# Patient Record
Sex: FEMALE | Race: OTHER | HISPANIC OR LATINO | URBAN - METROPOLITAN AREA
[De-identification: names, ages, dates, MRNs, and addresses within clinical notes are randomized per-mention and may not be internally consistent; named-entity substitution may affect disease eponyms.]

---

## 2017-04-07 ENCOUNTER — EMERGENCY (EMERGENCY)
Facility: HOSPITAL | Age: 22
LOS: 0 days | Discharge: HOME | End: 2017-04-07

## 2017-06-27 DIAGNOSIS — B34.9 VIRAL INFECTION, UNSPECIFIED: ICD-10-CM

## 2017-06-27 DIAGNOSIS — R07.9 CHEST PAIN, UNSPECIFIED: ICD-10-CM

## 2017-06-27 DIAGNOSIS — Z98.890 OTHER SPECIFIED POSTPROCEDURAL STATES: ICD-10-CM

## 2017-06-27 DIAGNOSIS — H92.03 OTALGIA, BILATERAL: ICD-10-CM

## 2017-06-27 PROBLEM — Z00.00 ENCOUNTER FOR PREVENTIVE HEALTH EXAMINATION: Status: ACTIVE | Noted: 2017-06-27

## 2018-02-09 ENCOUNTER — EMERGENCY (EMERGENCY)
Facility: HOSPITAL | Age: 23
LOS: 0 days | Discharge: HOME | End: 2018-02-09
Attending: EMERGENCY MEDICINE

## 2018-02-09 VITALS
WEIGHT: 154.98 LBS | DIASTOLIC BLOOD PRESSURE: 81 MMHG | TEMPERATURE: 98 F | HEART RATE: 85 BPM | HEIGHT: 71 IN | SYSTOLIC BLOOD PRESSURE: 127 MMHG | RESPIRATION RATE: 18 BRPM | OXYGEN SATURATION: 100 %

## 2018-02-09 DIAGNOSIS — R50.9 FEVER, UNSPECIFIED: ICD-10-CM

## 2018-02-09 DIAGNOSIS — B97.89 OTHER VIRAL AGENTS AS THE CAUSE OF DISEASES CLASSIFIED ELSEWHERE: ICD-10-CM

## 2018-02-09 DIAGNOSIS — R05 COUGH: ICD-10-CM

## 2018-02-09 DIAGNOSIS — J06.9 ACUTE UPPER RESPIRATORY INFECTION, UNSPECIFIED: ICD-10-CM

## 2018-02-09 DIAGNOSIS — M94.0 CHONDROCOSTAL JUNCTION SYNDROME [TIETZE]: ICD-10-CM

## 2018-02-09 NOTE — ED PROVIDER NOTE - ATTENDING CONTRIBUTION TO CARE
Pt is a 22 yo female who comes in for 1 week of cough productive of clear phelgm.  No SOB.  Notes chest pain with cough.  Fever at started on first 1-2d of symptoms but has resolved.  Mild sore throat now.  LMP 3 weeks ago.    Exam: normal pharynx, clear lungs, normal respiratory effort, cap refill <2s, reproducible b/l chest wall tenderness  Imp: viral URI, costochondritis  Plan: NSAIDs, cough suppressants, dc home

## 2018-02-09 NOTE — ED PROVIDER NOTE - NS ED ROS FT
Review of Systems  Constitutional: (-) fever (-) weight loss (-) weight gain (-) night sweats (-) weakness (-) weight loss  Cardiovascular: (-) syncope (-) chest pain (-) orthopnea (-) PND (-) palpitations  ENT: (-) sore throat (-) ear pain (-) runny nose  Respiratory: See HPI.  GI: (-) abdominal pain  Integumentary: (-) rash (-) redness (-) abrasions  Neurological: (-) focal neurologic deficit (-) focal deficit (-) altered mental status

## 2018-02-09 NOTE — ED PROVIDER NOTE - PHYSICAL EXAMINATION
Physical Exam  Vital Signs: I have reviewed the initial vital signs.  Constitutional: well-nourished, appears stated age, no acute distress  ENT: TM clear, no pharyngeal erythema or exudates, no rhinorrhea or nasal edema.  Cardiovascular: regular rate, regular rhythm, well-perfused extremities  Respiratory: unlabored respiratory effort, clear to auscultation bilaterally  Gastrointestinal: soft, non-tender abdomen,  Musculoskeletal: supple neck, no lower extremity edema  Integumentary: warm, dry, no rash

## 2018-02-09 NOTE — ED PROVIDER NOTE - OBJECTIVE STATEMENT
Pt. is a 24 yo f with no sig pmhx reports with cough one wek in durration and rib tenderness. She was coughing with Pt. is a 22 yo f with no sig pmhx reports with cough one week in duration and rib tenderness. The cough is produces clear yellow sputum, worse at night not associated with wheezing or SOB. Reports one episode of subjective fever last night not associated with chills or rigors. No recent travel, pt. has not taken anything for the cough. Denies n/v, abdominal pain, chest pain, pleurisy, exertional dyspnea, and myalgias.    I have reviewed available current nursing and previous documentation of past medical, surgical, family, and/or social history.

## 2018-06-19 ENCOUNTER — EMERGENCY (EMERGENCY)
Facility: HOSPITAL | Age: 23
LOS: 0 days | Discharge: HOME | End: 2018-06-19
Attending: EMERGENCY MEDICINE | Admitting: EMERGENCY MEDICINE

## 2018-06-19 VITALS
SYSTOLIC BLOOD PRESSURE: 117 MMHG | HEART RATE: 92 BPM | OXYGEN SATURATION: 98 % | DIASTOLIC BLOOD PRESSURE: 76 MMHG | RESPIRATION RATE: 18 BRPM | HEIGHT: 60 IN | TEMPERATURE: 98 F | WEIGHT: 166.89 LBS
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
## 2018-06-19 DIAGNOSIS — S63.91XA SPRAIN OF UNSPECIFIED PART OF RIGHT WRIST AND HAND, INITIAL ENCOUNTER: ICD-10-CM

## 2018-06-19 DIAGNOSIS — Z79.899 OTHER LONG TERM (CURRENT) DRUG THERAPY: ICD-10-CM
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
## 2018-06-19 DIAGNOSIS — S93.402A SPRAIN OF UNSPECIFIED LIGAMENT OF LEFT ANKLE, INITIAL ENCOUNTER: ICD-10-CM

## 2018-06-19 DIAGNOSIS — S80.11XA CONTUSION OF RIGHT LOWER LEG, INITIAL ENCOUNTER: ICD-10-CM

## 2018-06-19 DIAGNOSIS — S49.91XA UNSPECIFIED INJURY OF RIGHT SHOULDER AND UPPER ARM, INITIAL ENCOUNTER: ICD-10-CM
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
## 2018-06-19 NOTE — ED PROVIDER NOTE - CARE PLAN
Principal Discharge DX:	Hand sprain  Secondary Diagnosis:	Ankle sprain  Secondary Diagnosis:	Contusion

## 2018-06-19 NOTE — ED PROVIDER NOTE - PROGRESS NOTE DETAILS
MDM:  xrays unremarkable.  pt likely with ankle sprain.  pt placed in splint.  Pt likely with right leg contusion, no fracture seen.  Right hand with contusion.  Hand placed in ace wrap.  pt dc with outpatient ortho follow up.  Pt well appearing.

## 2018-06-19 NOTE — ED ADULT TRIAGE NOTE - CHIEF COMPLAINT QUOTE
Patient complaining of fall while walking her dog. Denies head trauma or LOC. Complaining of pain to B/L elbows, right shin and right hand.

## 2018-06-19 NOTE — ED PROVIDER NOTE - OBJECTIVE STATEMENT
23 year old female states was walking her dogs and fell and injured her right arm and leg and left ankle. no head injury no loss of consciousness. patient was able to get up with assistance. denies trunk injury. no nausea and vomiting no neck pain.

## 2018-06-19 NOTE — ED PROVIDER NOTE - NS ED ROS FT
Constitutional: (-) fever  Musculoskeletal: (-) neck pain, (-) back pain, (+) joint pain  Integumentary: (-) rash, (-) edema  Neurological: (-) headache, (-) altered mental status  Psychiatric: (-) hallucinations  Allergic/Immunologic: (-) pruritus

## 2018-06-19 NOTE — ED PROVIDER NOTE - ATTENDING CONTRIBUTION TO CARE
22 yo f with no pmh presents with multiple injuries s/p mechanical fall.  pt was walking her two dogs and the dogs got scared and ran away.  pt tripped and fell and injured right leg, left ankle and right hand.  no head injury, no loc, no nausea, no vomiting, no abd pain, no cp, no sob, no back pain, no other complaints.  no numbness, no weakness.    awake, alert.  neck supple.  abd soft, nontender.  pelvis stable.    RUE: mild hand tenderness, no open skin, mild tenderness to 2nd metacarpal, no deformity, rom intact in hand.  no wrist tenderness, no elbow or shoulder tenderness; rom off wrist intact.    RLE: mild tenderness/ecchymosis to proximal calf, no bony tenderness, no knee tenderness, no ankle/foot tenderness, motor/sensation intact; rom of knee intact.  LLE: no knee tenderness, no mid foot tenderness, no tenderness to base of 5th metatarsal; + ankle swelling and tenderness, no open skin, 2+ dp pulse, motor/sensation intact.  a/p:  multiple extrmity injuries.  p:  xrays, reassess.  NO bleeding, no lacerations. no abrasions

## 2018-06-19 NOTE — ED PROVIDER NOTE - PHYSICAL EXAMINATION
Physical Exam    Vital Signs: I have reviewed the initial vital signs.  Constitutional: well-nourished, appears stated age, no acute distress  Cardiovascular: S1 and S2, regular rate, regular rhythm, well-perfused extremities, radial pulses equal and 2+  Respiratory: unlabored respiratory effort, clear to auscultation bilaterally no wheezing, rales and rhonchi  Gastrointestinal: soft, non-tender abdomen, no pulsatile mass, normal bowl sounds  Musculoskeletal: supple neck, no lower extremity edema, no midline tenderness, + Left ankle tenderness and swelling LROM secondary to pain, no knee or foot tenderness, good and equal dp pulses. + right proximal lower leg tenderness and swelling and area of ecchymosis present, FROM of knee and ankle, patient is able to bear weight on leg. + right hand/wrist tenderness and swelling FROM of entire arm and good and equal radial pulses.   Integumentary: warm, dry, no rash  Neurologic: awake, alert, cranial nerves II-XII grossly intact, extremities’ motor and sensory functions grossly intact  Psychiatric: appropriate mood, appropriate affect

## 2018-06-20 NOTE — ED PROCEDURE NOTE - NS ED PERI NEURO NEG
The patient/caregiver verbalized understanding of how to care for the injured extremity with splint/Post-application: Motor, sensory, and vascular responses intact in the injured extremity./Pre-application: Motor, sensory, and vascular responses intact in the injured extremity.
Post-application: Motor, sensory, and vascular responses intact in the injured extremity./The patient/caregiver verbalized understanding of how to care for the injured extremity with splint/Pre-application: Motor, sensory, and vascular responses intact in the injured extremity.

## 2018-06-20 NOTE — ED PROCEDURE NOTE - ATTENDING CONTRIBUTION TO CARE
I was present for and supervised the key/critical aspects of the procedures performed during the care of the patient.
I was present for and supervised the key/critical aspects of the procedures performed during the care of the patient.

## 2018-06-20 NOTE — ED PROCEDURE NOTE - CPROC ED TIME OUT STATEMENT1
“Patient's name, , procedure and correct site were confirmed during the Anthony Timeout.”
“Patient's name, , procedure and correct site were confirmed during the Felda Timeout.”

## 2018-06-20 NOTE — ED PROCEDURE NOTE - CPROC ED POST PROC CARE GUIDE1
Keep the cast/splint/dressing clean and dry./Verbal/written post procedure instructions were given to patient/caregiver./Instructed patient/caregiver to follow-up with primary care physician./Elevate the injured extremity as instructed.
Verbal/written post procedure instructions were given to patient/caregiver./Instructed patient/caregiver to follow-up with primary care physician./Instructed patient/caregiver regarding signs and symptoms of infection./Elevate the injured extremity as instructed./Keep the cast/splint/dressing clean and dry.

## 2018-07-06 NOTE — ED PROVIDER NOTE - DISCHARGE REVIEW MATERIAL PRESENTED
Patient is returning call for her results.    Phone: [phone]   
Returned call to patient per request. Stress Test results conveyed, stable. Patient verbalizes understanding and has no further questions at this time.   
.

## 2018-12-22 ENCOUNTER — EMERGENCY (EMERGENCY)
Facility: HOSPITAL | Age: 23
LOS: 0 days | Discharge: HOME | End: 2018-12-22
Attending: EMERGENCY MEDICINE | Admitting: EMERGENCY MEDICINE

## 2018-12-22 VITALS
SYSTOLIC BLOOD PRESSURE: 139 MMHG | TEMPERATURE: 100 F | HEART RATE: 118 BPM | OXYGEN SATURATION: 97 % | WEIGHT: 149.91 LBS | RESPIRATION RATE: 18 BRPM | DIASTOLIC BLOOD PRESSURE: 92 MMHG | HEIGHT: 60 IN

## 2018-12-22 VITALS
HEART RATE: 73 BPM | RESPIRATION RATE: 18 BRPM | SYSTOLIC BLOOD PRESSURE: 112 MMHG | TEMPERATURE: 98 F | DIASTOLIC BLOOD PRESSURE: 70 MMHG | OXYGEN SATURATION: 98 %

## 2018-12-22 DIAGNOSIS — K08.89 OTHER SPECIFIED DISORDERS OF TEETH AND SUPPORTING STRUCTURES: ICD-10-CM

## 2018-12-22 DIAGNOSIS — J02.9 ACUTE PHARYNGITIS, UNSPECIFIED: ICD-10-CM

## 2018-12-22 DIAGNOSIS — R22.0 LOCALIZED SWELLING, MASS AND LUMP, HEAD: ICD-10-CM

## 2018-12-22 DIAGNOSIS — Z79.2 LONG TERM (CURRENT) USE OF ANTIBIOTICS: ICD-10-CM

## 2018-12-22 LAB
ALBUMIN SERPL ELPH-MCNC: 4.6 G/DL — SIGNIFICANT CHANGE UP (ref 3.5–5.2)
ALP SERPL-CCNC: 96 U/L — SIGNIFICANT CHANGE UP (ref 30–115)
ALT FLD-CCNC: 19 U/L — SIGNIFICANT CHANGE UP (ref 0–41)
ANION GAP SERPL CALC-SCNC: 15 MMOL/L — HIGH (ref 7–14)
AST SERPL-CCNC: 25 U/L — SIGNIFICANT CHANGE UP (ref 0–41)
BASOPHILS # BLD AUTO: 0.02 K/UL — SIGNIFICANT CHANGE UP (ref 0–0.2)
BASOPHILS NFR BLD AUTO: 0.2 % — SIGNIFICANT CHANGE UP (ref 0–1)
BILIRUB SERPL-MCNC: 0.9 MG/DL — SIGNIFICANT CHANGE UP (ref 0.2–1.2)
BUN SERPL-MCNC: 12 MG/DL — SIGNIFICANT CHANGE UP (ref 10–20)
CALCIUM SERPL-MCNC: 9.5 MG/DL — SIGNIFICANT CHANGE UP (ref 8.5–10.1)
CHLORIDE SERPL-SCNC: 100 MMOL/L — SIGNIFICANT CHANGE UP (ref 98–110)
CO2 SERPL-SCNC: 24 MMOL/L — SIGNIFICANT CHANGE UP (ref 17–32)
CREAT SERPL-MCNC: 0.8 MG/DL — SIGNIFICANT CHANGE UP (ref 0.7–1.5)
EOSINOPHIL # BLD AUTO: 0.2 K/UL — SIGNIFICANT CHANGE UP (ref 0–0.7)
EOSINOPHIL NFR BLD AUTO: 2 % — SIGNIFICANT CHANGE UP (ref 0–8)
GLUCOSE SERPL-MCNC: 95 MG/DL — SIGNIFICANT CHANGE UP (ref 70–99)
HCT VFR BLD CALC: 40.8 % — SIGNIFICANT CHANGE UP (ref 37–47)
HGB BLD-MCNC: 13.6 G/DL — SIGNIFICANT CHANGE UP (ref 12–16)
IMM GRANULOCYTES NFR BLD AUTO: 0.3 % — SIGNIFICANT CHANGE UP (ref 0.1–0.3)
LACTATE SERPL-SCNC: 1 MMOL/L — SIGNIFICANT CHANGE UP (ref 0.5–2.2)
LYMPHOCYTES # BLD AUTO: 2.06 K/UL — SIGNIFICANT CHANGE UP (ref 1.2–3.4)
LYMPHOCYTES # BLD AUTO: 20.4 % — LOW (ref 20.5–51.1)
MCHC RBC-ENTMCNC: 29.6 PG — SIGNIFICANT CHANGE UP (ref 27–31)
MCHC RBC-ENTMCNC: 33.3 G/DL — SIGNIFICANT CHANGE UP (ref 32–37)
MCV RBC AUTO: 88.7 FL — SIGNIFICANT CHANGE UP (ref 81–99)
MONOCYTES # BLD AUTO: 0.69 K/UL — HIGH (ref 0.1–0.6)
MONOCYTES NFR BLD AUTO: 6.8 % — SIGNIFICANT CHANGE UP (ref 1.7–9.3)
NEUTROPHILS # BLD AUTO: 7.1 K/UL — HIGH (ref 1.4–6.5)
NEUTROPHILS NFR BLD AUTO: 70.3 % — SIGNIFICANT CHANGE UP (ref 42.2–75.2)
NRBC # BLD: 0 /100 WBCS — SIGNIFICANT CHANGE UP (ref 0–0)
PLATELET # BLD AUTO: 312 K/UL — SIGNIFICANT CHANGE UP (ref 130–400)
POTASSIUM SERPL-MCNC: 5.1 MMOL/L — HIGH (ref 3.5–5)
POTASSIUM SERPL-SCNC: 5.1 MMOL/L — HIGH (ref 3.5–5)
PROT SERPL-MCNC: 8 G/DL — SIGNIFICANT CHANGE UP (ref 6–8)
RBC # BLD: 4.6 M/UL — SIGNIFICANT CHANGE UP (ref 4.2–5.4)
RBC # FLD: 13.1 % — SIGNIFICANT CHANGE UP (ref 11.5–14.5)
SODIUM SERPL-SCNC: 139 MMOL/L — SIGNIFICANT CHANGE UP (ref 135–146)
WBC # BLD: 10.1 K/UL — SIGNIFICANT CHANGE UP (ref 4.8–10.8)
WBC # FLD AUTO: 10.1 K/UL — SIGNIFICANT CHANGE UP (ref 4.8–10.8)

## 2018-12-22 RX ORDER — KETOROLAC TROMETHAMINE 30 MG/ML
30 SYRINGE (ML) INJECTION ONCE
Qty: 0 | Refills: 0 | Status: DISCONTINUED | OUTPATIENT
Start: 2018-12-22 | End: 2018-12-22

## 2018-12-22 RX ORDER — SODIUM CHLORIDE 9 MG/ML
1000 INJECTION, SOLUTION INTRAVENOUS ONCE
Qty: 0 | Refills: 0 | Status: COMPLETED | OUTPATIENT
Start: 2018-12-22 | End: 2018-12-22

## 2018-12-22 RX ADMIN — Medication 30 MILLIGRAM(S): at 11:09

## 2018-12-22 RX ADMIN — SODIUM CHLORIDE 1000 MILLILITER(S): 9 INJECTION, SOLUTION INTRAVENOUS at 11:09

## 2018-12-22 NOTE — ED ADULT NURSE NOTE - NSIMPLEMENTINTERV_GEN_ALL_ED
Implemented All Universal Safety Interventions:  [place] to call system. Call bell, personal items and telephone within reach. Instruct patient to call for assistance. Room bathroom lighting operational. Non-slip footwear when patient is off stretcher. Physically safe environment: no spills, clutter or unnecessary equipment. Stretcher in lowest position, wheels locked, appropriate side rails in place.

## 2018-12-22 NOTE — ED PROVIDER NOTE - PHYSICAL EXAMINATION
GEN: Alert & Oriented x 3, No acute distress. Calm, appropriate.  Head and Neck:  (+) cervical lymphadenopathy.   ENT Oral mucosa pink, moist without lesions. Pharyngeal injection noted. No exudate. Some right facial swelling. No tenderness or fluctuance of right lower gingiva. No dental caries noted. TM clear bilaterally. No mastoid tenderness. No tenderness with manipulation of pinna and tragus.  Eyes: PERRL. No conjunctival injection.   RESP: Lungs clear to auscult bilat. no wheezes, rhonchi or rales. No retractions. Equal air entry.  CARDIO: regular rate and rhythm, no murmurs, rubs or gallops. Normal S1, S2.   ABD: Soft, Nondistended. No rebound tenderness/guarding. No pulsatile mass. No tenderness with palpation x 4 quadrants.  MS: No midline tenderness throughout.   SKIN: no rashes/lesions, no petechiae, no ecchymosis.

## 2018-12-22 NOTE — ED PROVIDER NOTE - ATTENDING CONTRIBUTION TO CARE
23y Female with no significant PMH is presenting to ED with right sided facial swelling and pain x 2 days. Patientd enies cp/sob, no n/v/d, no loc. No trauma. Patient endorses pain over tooth 30-31.     CONSTITUTIONAL: Well-developed; well-nourished; in no acute distress. Sitting up and providing appropriate history and physical examination  SKIN: skin exam is warm and dry, no acute rash.  HEAD: Normocephalic; atraumatic.  EYES: PERRL, 3 mm bilateral, no nystagmus, EOM intact; conjunctiva and sclera clear.  ENT: + mild pharyngeal erythema, no exudate, non edema, no pooling of secretions, No nasal discharge; airway clear. + mild erythema near right inferior gingiva tooth 30, + midl tenderness, + right tender anterior cervical LN, no fluctaunce  NECK: Supple; non tender. + full passive ROM in all directions. No JVD  CARD: S1, S2 normal; no murmurs, gallops, or rubs. Regular rate and rhythm. + Symmetric Strong Pulses  RESP: No wheezes, rales or rhonchi. Good air movement bilaterally  NEURO:  Alert, oriented, grossly unremarkable. No Focal deficits. GCS 15. NIH 0  PSYCH: Cooperative, appropriate.       labs and imaging unremarkable. I have fully discussed the medical management and delivery of care with the patient. I have discussed any available labs, imaging and treatment options with the patient. Patient confirms understanding and has been given detailed return precautions. Patient instructed to return to the ED should symptoms persist or worsen. Patient has demonstrated capacity and has verbalized understanding. Patient is well appearing upon discharge. Will follow with pmd and dental. Started on antibiotics

## 2018-12-22 NOTE — ED PROVIDER NOTE - OBJECTIVE STATEMENT
The patient is a 23y Female with no significant PMH is presenting to ED with right sided facial swelling and pain x 2 days. She states the pain is located over her right jaw and radiates up into right ear. Patient endorses tooth pain and sore throat. She denies fever/chills, drainage, nasal congestions, sinus pressure, HA, chest pain, shortness of breath, & cough.

## 2018-12-22 NOTE — ED ADULT TRIAGE NOTE - CHIEF COMPLAINT QUOTE
Patient complaining of right sided facial swelling, bleeding after brushing her teeth, pain in her gums on right side 9/10.

## 2018-12-22 NOTE — ED PROVIDER NOTE - NSFOLLOWUPCLINICS_GEN_ALL_ED_FT
[facility]  Dental  [address] [place] [id number]  Phone: [phone]  Fax:   Follow Up Time:

## 2018-12-22 NOTE — ED PROVIDER NOTE - NS ED ROS FT
GEN: (-) fever, (-) chills  HEENT: (-) vision changes, (-) HA, (+) sore throat, (+) ear pain, (+) dentalgia, (+) facial swelling  CV: (-) chest pain, (-) palpitations  PULM: (-) cough, (-) wheezing, (-) dyspnea  GI: (-) abdominal pain,(-) Nausea, (-) Vomiting  NEURO: (-) weakness, (-) paresthesias, (-) syncope  : (-) dysuria, (-) urgency, (-) Frequency, (-) hematuria  MS: (-) back pain, (-) joint pain, (-)myalgias, (-) swelling  SKIN: (-) rashes, (-) new lesions  HEME: (-) bleeding, (-) ecchymosis

## 2018-12-23 ENCOUNTER — EMERGENCY (EMERGENCY)
Facility: HOSPITAL | Age: 23
LOS: 0 days | Discharge: HOME | End: 2018-12-23
Attending: EMERGENCY MEDICINE | Admitting: EMERGENCY MEDICINE

## 2018-12-23 VITALS
OXYGEN SATURATION: 99 % | RESPIRATION RATE: 20 BRPM | TEMPERATURE: 98 F | HEART RATE: 89 BPM | DIASTOLIC BLOOD PRESSURE: 87 MMHG | SYSTOLIC BLOOD PRESSURE: 142 MMHG

## 2018-12-23 DIAGNOSIS — K08.89 OTHER SPECIFIED DISORDERS OF TEETH AND SUPPORTING STRUCTURES: ICD-10-CM

## 2018-12-23 DIAGNOSIS — Z79.2 LONG TERM (CURRENT) USE OF ANTIBIOTICS: ICD-10-CM

## 2018-12-23 DIAGNOSIS — K02.9 DENTAL CARIES, UNSPECIFIED: ICD-10-CM

## 2018-12-23 RX ORDER — DEXAMETHASONE 0.5 MG/5ML
10 ELIXIR ORAL ONCE
Qty: 0 | Refills: 0 | Status: COMPLETED | OUTPATIENT
Start: 2018-12-23 | End: 2018-12-23

## 2018-12-23 RX ADMIN — Medication 10 MILLIGRAM(S): at 10:05

## 2018-12-23 NOTE — ED ADULT NURSE NOTE - NSIMPLEMENTINTERV_GEN_ALL_ED
Implemented All Universal Safety Interventions:  [place] to call system. Call bell, personal items and telephone within reach. Instruct patient to call for assistance. Room bathroom lighting operational. Non-slip footwear when patient is off stretcher. Physically safe environment: no spills, clutter or unnecessary equipment. Stretcher in lowest position, wheels locked, appropriate side rails in place.

## 2018-12-23 NOTE — ED PROVIDER NOTE - OBJECTIVE STATEMENT
22 y/o female c/o "I have right lower dental pain for 2-3 days with fever Tmax 100.4 and painful swallowing. I was seen at [facility] yesterday and I had a CT scan."

## 2018-12-23 NOTE — ED PROVIDER NOTE - NSFOLLOWUPINSTRUCTIONS_ED_ALL_ED_FT
Pharyngitis    Pharyngitis is inflammation of your pharynx, which is typically caused by a viral or bacterial infection. Pharyngitis can be contagious and may spread from person to person through intimate contact, coughing, sneezing, or sharing personal items and utensils. Symptoms of pharyngitis may include sore throat, fever, headache, or swollen lymph nodes. If you are prescribed antibiotics, make sure you finish them even if you start to feel better. Gargle with salt water as often as every 1-2 hours to soothe your throat. Throat lozenges (if you are not at risk for choking) or sprays may be used to soothe your throat.    SEEK IMMEDIATE MEDICAL CARE IF YOU HAVE ANY OF THE FOLLOWING SYMPTOMS: neck stiffness, drooling, hoarseness or change in voice, inability to swallow liquids, vomiting, or trouble breathing.    Dental Pain    Dental pain (toothache) may be caused by many things including tooth decay (cavities or caries), abscess or infection, or trauma. If you were prescribed an antibiotic medicine, finish all of it even if you start to feel better. Rinsing your mouth with salt water or applying ice to the painful area of your face may help with the pain. Follow up with a dentist is important in ensuring good oral health and preventing the worsening of dental disease.    SEEK IMMEDIATE MEDICAL CARE IF YOU HAVE ANY OF THE FOLLOWING SYMPTOMS: unable to open your mouth, trouble breathing or swallowing, fever, or swelling of the face, neck, or jaw.

## 2018-12-23 NOTE — ED ADULT NURSE NOTE - OBJECTIVE STATEMENT
pt went to [facility] yesterday and was prescribed oral ABX. pt presents today with halitosis, increased pain/ swelling of right side mouth and neck as per pt. pt states she has been drooling and has had difficulty swallowing. pt reports being in intense pain since this morning.

## 2018-12-23 NOTE — ED PROVIDER NOTE - PROGRESS NOTE DETAILS
Reviewed chart from [facility] no abscess. Patient prescribed Augmentin has not started so far. Patient evaluated by Dental recommend continue PO antibiotics follow up in dental clinic tomorrow. Patient feeling better. Able to open mouth better. Patient was able to take Augmentin and Motrin PO.

## 2018-12-23 NOTE — CONSULT NOTE ADULT - SUBJECTIVE AND OBJECTIVE BOX
Patient is a 23y old  Female who presents with a chief complaint of "My wisdom tooth has been hurting me in the back right, for two days now. It's a sharp pain."    HPI: 2 days      PAST MEDICAL & SURGICAL HISTORY:  No pertinent past medical history  No significant past surgical history    (   ) heart valve replacement  (   ) joint replacement  (   ) pregnancy    MEDICATIONS  (STANDING):    MEDICATIONS  (PRN):      REVIEW OF SYSTEMS      General:	    Skin/Breast:  	  Ophthalmologic:  	  ENMT:	    Respiratory and Thorax:  	  Cardiovascular:	    Gastrointestinal:	    Genitourinary:	    Musculoskeletal:	    Neurological:	    Psychiatric:	    Hematology/Lymphatics:	    Endocrine:	    Allergic/Immunologic:	    Allergies    No Known Allergies    Intolerances        FAMILY HISTORY:  No pertinent family history in first degree relatives      *SOCIAL HISTORY: (   ) Tobacco; (   ) ETOH    Vital Signs Last 24 Hrs  T(C): 36.5 (23 Dec 2018 08:48), Max: 36.6 (22 Dec 2018 16:00)  T(F): 97.7 (23 Dec 2018 08:48), Max: 97.8 (22 Dec 2018 16:00)  HR: 89 (23 Dec 2018 08:48) (73 - 89)  BP: 142/87 (23 Dec 2018 08:48) (112/70 - 142/87)  BP(mean): --  RR: 20 (23 Dec 2018 08:48) (18 - 20)  SpO2: 99% (23 Dec 2018 08:48) (98% - 99%)    LABS:                        13.6   10.10 )-----------( 312      ( 22 Dec 2018 11:30 )             40.8     12-22    139  |  100  |  12  ----------------------------<  95  5.1<H>   |  24  |  0.8    Ca    9.5      22 Dec 2018 11:30    TPro  8.0  /  Alb  4.6  /  TBili  0.9  /  DBili  x   /  AST  25  /  ALT  19  /  AlkPhos  96  12-22    Platelet Count - Automated: 312 K/uL [130 - 400] (12-22 @ 11:30)  WBC Count: 10.10 K/uL [4.80 - 10.80] (12-22 @ 11:30)          Last Dental Visit: << Patient did not report >>    EOE:  TMJ (   ) clicks                     (   ) pops                     (   ) crepitus             Mandible <<FROM>>             Facial bones and MOM <<grossly intact>>             ( x  ) trismus             ( x  ) lymphadenopathy             (  x ) swelling             (   ) asymmetry             ( x  ) palpation             (   ) dyspnea             (   ) dysphagia             (   ) loss of consciousness    IOE:  pericoronitis #32 with localized swelling extending to neck                hard/soft palate:  (   ) palatal torus, <<No pathology noted>>            tongue/FOM <<No pathology noted>>            labial/buccal mucosa <<No pathology noted>>           (   ) percussion           ( x  ) palpation           (   ) swelling            (   ) abscess           (   ) sinus tract    *DENTAL RADIOGRAPHS: none exposed    RADIOLOGY & ADDITIONAL STUDIES: n/a    *ASSESSMENT: buccal space abscess associated with #32 causing localized R sided neck and buccal swelling    *PLAN: evaluate with dental clinic, antibiotic and pain management as necessary    PROCEDURE:   Verbal and written consent given.  Anesthesia: <<  No anesthesia administered.  >>   Treatment: <<  Limited oral exam performed. Patient presents with pain of the past two days. Extra-oral right sided cheek and neck swelling. Patient experiences trismus - limited mouth opening of about 6 mm present. Patient does not have closed airway. Palpation sensitive along #32 vestibule. Percussion not able to be performed due to trismus. Patient did not take previous Augmentin prescription as provided from [facility]. Advised patient that due to infection from #32, swelling has occurred. Advised swelling needs to go down first before treatment can occur. Recommended prescription pain medication (patient previously taking over the counter Motrin for pain). Advised follow up with dental clinic on Monday (patient does not have insurance). Am not able to perform dental block due to limited mouth opening.  >>     RECOMMENDATIONS:  1) <<  Continuation of Augmentin prescription and Ibuprofen 600 mg, 10 tabs >>  2) Dental F/U with outpatient dentist for comprehensive dental care.   3) If any difficulty swallowing/breathing, fever occur, return to ER.     Resident Name, pager # [phone], [phone]

## 2018-12-23 NOTE — ED PROVIDER NOTE - CARE PLAN
Principal Discharge DX:	Pharyngitis  Secondary Diagnosis:	Toothache  Secondary Diagnosis:	Lymphadenopathy

## 2018-12-23 NOTE — ED PROVIDER NOTE - NSFOLLOWUPCLINICS_GEN_ALL_ED_FT
[facility]  Dentistry  .  [place] [address]  Phone: [phone]  Fax:   Follow Up Time: [facility]  Dental  [address] [place] [id number]  Phone: [phone]  Fax:

## 2018-12-23 NOTE — ED PROVIDER NOTE - ATTENDING CONTRIBUTION TO CARE
24 y/o female  here for eval I have right lower dental pain for 2-3 days with fever Tmax 100.4 and painful swallowing. pt was here for pain, seen at [facility] yesterday with ct soft tissue neck which was nmegative. pt given abx for 10 days for possible pharyngitis.  pt on exam with normal speech, ttp tooth #32, uvual midline, left pharyngeal exudate. impression dental and throat pain, pt seen by dental, plan for abx and dental clinic tomorrow. pt on abx for pharyngitis. pt tolerated po. dc home

## 2018-12-24 ENCOUNTER — EMERGENCY (EMERGENCY)
Facility: HOSPITAL | Age: 23
LOS: 0 days | Discharge: HOME | End: 2018-12-24
Admitting: PHYSICIAN ASSISTANT

## 2018-12-24 VITALS
HEIGHT: 60 IN | OXYGEN SATURATION: 98 % | WEIGHT: 149.91 LBS | RESPIRATION RATE: 18 BRPM | TEMPERATURE: 98 F | DIASTOLIC BLOOD PRESSURE: 82 MMHG | HEART RATE: 72 BPM | SYSTOLIC BLOOD PRESSURE: 112 MMHG

## 2018-12-24 DIAGNOSIS — Z79.2 LONG TERM (CURRENT) USE OF ANTIBIOTICS: ICD-10-CM

## 2018-12-24 DIAGNOSIS — K08.89 OTHER SPECIFIED DISORDERS OF TEETH AND SUPPORTING STRUCTURES: ICD-10-CM

## 2018-12-24 DIAGNOSIS — J34.89 OTHER SPECIFIED DISORDERS OF NOSE AND NASAL SINUSES: ICD-10-CM

## 2018-12-24 NOTE — ED PROVIDER NOTE - TOOTH FINDINGS
to right lower molar with minimal gum swelling, no bleeding, no drainage, no visible or palpable abscess/PERCUSSION TENDERNESS/DENTAL CARIES

## 2018-12-24 NOTE — ED PROVIDER NOTE - PLAN OF CARE
pt. with right lower molar toothache and swelling, advised by dental for extraction and requests to go to dental clinic. Spoke with dental clinic and approved EM appt. now. Pt. will continue her prescribed oral anbx and pain meds. Pt. aware to return to the ER if any difficulty swallowing, breathing, fevers, chills, excessive bleeding or any other concerns.

## 2018-12-24 NOTE — ED PROVIDER NOTE - OBJECTIVE STATEMENT
Pt. is a 23 year old female with an unremarkable past medical history who presents with complaints of pain & swelling to right lower molar/wisdom tooth x few days. Pt. on Augmentin oral antibiotics and nsaids with some relief but still having pain. Pt. was evaluated yesterday in the ED and advised by dental resident to come to the dental clinic today for an extraction. Pt. denies any difficulty swallowing, breathing, fevers, chills, neck pain, jaw pain, bleeding from mouth, otalgia, or any other complaints.

## 2018-12-24 NOTE — ED PROVIDER NOTE - MOUTH
no ulcers no ulcers/No tenderness with manipulation of pinna and tragus., no facial swelling or erythema

## 2018-12-24 NOTE — ED PROVIDER NOTE - CARE PLAN
Principal Discharge DX:	Toothache  Assessment and plan of treatment:	pt. with right lower molar toothache and swelling, advised by dental for extraction and requests to go to dental clinic. Spoke with dental clinic and approved EM appt. now. Pt. will continue her prescribed oral anbx and pain meds. Pt. aware to return to the ER if any difficulty swallowing, breathing, fevers, chills, excessive bleeding or any other concerns.

## 2018-12-24 NOTE — CONSULT NOTE ADULT - SUBJECTIVE AND OBJECTIVE BOX
Patient is a 23y old  Female who presents with a chief complaint of lower right dental pain and swelling for 4 days.    HPI: Patient reports that she has been to the emergency room for the past two nights due to large swelling of the lower right side. Patient reports that the swelling has decreased after taking antibiotics and steroids administered in the emergency room.  Patient reports that her last dose of antibiotics was taken at 10AM on 12/23/18.      PAST MEDICAL & SURGICAL HISTORY:  No pertinent past medical history  No significant past surgical history    ( -  ) heart valve replacement  ( -  ) joint replacement  ( -  ) pregnancy    MEDICATIONS  :  Amoxicillin and ibuprofen    Allergies :  No Known Allergies        FAMILY HISTORY:  No pertinent family history in first degree relatives      *SOCIAL HISTORY: ( -  ) Tobacco      Vital Signs Last 24 Hrs  T(C): 36.6 (24 Dec 2018 08:03), Max: 36.6 (24 Dec 2018 08:03)  T(F): 97.9 (24 Dec 2018 08:03), Max: 97.9 (24 Dec 2018 08:03)  HR: 72 (24 Dec 2018 08:03) (72 - 72)  BP: 112/82 (24 Dec 2018 08:03) (112/82 - 112/82)  BP(mean): --  RR: 18 (24 Dec 2018 08:03) (18 - 18)  SpO2: 98% (24 Dec 2018 08:03) (98% - 98%)    LABS:                        13.6   10.10 )-----------( 312      ( 22 Dec 2018 11:30 )             40.8     12-22    139  |  100  |  12  ----------------------------<  95  5.1<H>   |  24  |  0.8    Ca    9.5      22 Dec 2018 11:30    TPro  8.0  /  Alb  4.6  /  TBili  0.9  /  DBili  x   /  AST  25  /  ALT  19  /  AlkPhos  96  12-22    Platelet Count - Automated: 312 K/uL [130 - 400] (12-22 @ 11:30)  WBC Count: 10.10 K/uL [4.80 - 10.80] (12-22 @ 11:30)          Last Dental Visit: << many years ago >>    EOE:  TMJ ( -  ) clicks                     ( -  ) pops                     ( -  ) crepitus             Mandible <<FROM>>             Facial bones and MOM <<grossly intact>>             ( -  ) trismus             ( -  ) lymphadenopathy             ( -  ) swelling             ( -  ) asymmetry             ( +  ) palpation             ( -  ) dyspnea             ( -  ) dysphagia             ( -  ) loss of consciousness    IOE:  <<permanent>> dentition:            <<grossly intact>> OR             <<multiple carious teeth>>                      	    Mobility << - >>                     Caries << + >>                hard/soft palate:  ( -  ) palatal torus, <<No pathology noted>>            tongue/FOM <<No pathology noted>>            labial/buccal mucosa <<No pathology noted>>           ( -  ) percussion           ( +  ) palpation           ( +  ) swelling            ( -  ) abscess           ( -  ) sinus tract    *DENTAL RADIOGRAPHS: periapical of tooth #32    *ASSESSMENT: Upon clinical exam, pericoronitis noted distal to tooth #32. Very tender to palpation of area distal to #32. Erythema and purulent drainage noted distal to #32.     *PLAN:     PROCEDURE:   Verbal and written consent given.  Anesthesia: << 3 carpules of 4% septocaine with 1:[id number] epinephrine used for long buccal infiltration and inferior alveolar nerve block and 1 carpule of 3% mepivicaine used for local infiltration.    >>   Treatment: << Risks and benefits discussed as per OS sheet dated 7/13/00. Side/site consents obtained. Incision and flap made on the buccal aspect of #32 to reflect soft tissue. Tooth #32 extracted non-surgically using elevator and forceps. Post op radiograph negative. Hemostasis achieved. Post op instructions reviewed and provided written.    >>     Explained to patient that there is a large carious lesion in tooth #30 that requires dental treatment as soon as possible. Patient understands and says that she will return as soon as she applies for dental insurance. Informed patient that if pain or swelling return, to present to [facility] as an emergency. Stressed to patient the importance of taking remainder of antibiotics as prescribed.    RECOMMENDATIONS:  1) << Take medications as prescribed.  >>  2) Dental F/U with outpatient dentist or return to [facility] for comprehensive dental care.   3) If any difficulty swallowing/breathing, fever occur, return to ER.     Resident Name, pager #  David Jain, [phone]

## 2019-05-28 ENCOUNTER — OTHER (OUTPATIENT)
Age: 24
End: 2019-05-28

## 2019-06-15 ENCOUNTER — EMERGENCY (EMERGENCY)
Facility: HOSPITAL | Age: 24
LOS: 0 days | Discharge: HOME | End: 2019-06-15
Attending: EMERGENCY MEDICINE | Admitting: EMERGENCY MEDICINE
Payer: COMMERCIAL

## 2019-06-15 VITALS
RESPIRATION RATE: 18 BRPM | DIASTOLIC BLOOD PRESSURE: 70 MMHG | HEART RATE: 64 BPM | SYSTOLIC BLOOD PRESSURE: 114 MMHG | TEMPERATURE: 98 F

## 2019-06-15 VITALS
RESPIRATION RATE: 16 BRPM | TEMPERATURE: 96 F | OXYGEN SATURATION: 98 % | SYSTOLIC BLOOD PRESSURE: 124 MMHG | DIASTOLIC BLOOD PRESSURE: 77 MMHG | WEIGHT: 169.98 LBS | HEART RATE: 89 BPM

## 2019-06-15 DIAGNOSIS — R06.02 SHORTNESS OF BREATH: ICD-10-CM

## 2019-06-15 DIAGNOSIS — R10.11 RIGHT UPPER QUADRANT PAIN: ICD-10-CM

## 2019-06-15 DIAGNOSIS — K80.20 CALCULUS OF GALLBLADDER WITHOUT CHOLECYSTITIS WITHOUT OBSTRUCTION: ICD-10-CM

## 2019-06-15 DIAGNOSIS — K80.70 CALCULUS OF GALLBLADDER AND BILE DUCT WITHOUT CHOLECYSTITIS WITHOUT OBSTRUCTION: ICD-10-CM

## 2019-06-15 LAB
ALBUMIN SERPL ELPH-MCNC: 4.2 G/DL — SIGNIFICANT CHANGE UP (ref 3.5–5.2)
ALP SERPL-CCNC: 109 U/L — SIGNIFICANT CHANGE UP (ref 30–115)
ALT FLD-CCNC: 23 U/L — SIGNIFICANT CHANGE UP (ref 0–41)
ANION GAP SERPL CALC-SCNC: 8 MMOL/L — SIGNIFICANT CHANGE UP (ref 7–14)
AST SERPL-CCNC: 29 U/L — SIGNIFICANT CHANGE UP (ref 0–41)
BASOPHILS # BLD AUTO: 0.05 K/UL — SIGNIFICANT CHANGE UP (ref 0–0.2)
BASOPHILS NFR BLD AUTO: 0.5 % — SIGNIFICANT CHANGE UP (ref 0–1)
BILIRUB SERPL-MCNC: 0.4 MG/DL — SIGNIFICANT CHANGE UP (ref 0.2–1.2)
BUN SERPL-MCNC: 19 MG/DL — SIGNIFICANT CHANGE UP (ref 10–20)
CALCIUM SERPL-MCNC: 9.5 MG/DL — SIGNIFICANT CHANGE UP (ref 8.5–10.1)
CHLORIDE SERPL-SCNC: 104 MMOL/L — SIGNIFICANT CHANGE UP (ref 98–110)
CO2 SERPL-SCNC: 27 MMOL/L — SIGNIFICANT CHANGE UP (ref 17–32)
CREAT SERPL-MCNC: 0.5 MG/DL — LOW (ref 0.7–1.5)
EOSINOPHIL # BLD AUTO: 0.24 K/UL — SIGNIFICANT CHANGE UP (ref 0–0.7)
EOSINOPHIL NFR BLD AUTO: 2.6 % — SIGNIFICANT CHANGE UP (ref 0–8)
GLUCOSE SERPL-MCNC: 105 MG/DL — HIGH (ref 70–99)
HCT VFR BLD CALC: 40 % — SIGNIFICANT CHANGE UP (ref 37–47)
HGB BLD-MCNC: 13 G/DL — SIGNIFICANT CHANGE UP (ref 12–16)
IMM GRANULOCYTES NFR BLD AUTO: 0.4 % — HIGH (ref 0.1–0.3)
LIDOCAIN IGE QN: 31 U/L — SIGNIFICANT CHANGE UP (ref 7–60)
LYMPHOCYTES # BLD AUTO: 3.12 K/UL — SIGNIFICANT CHANGE UP (ref 1.2–3.4)
LYMPHOCYTES # BLD AUTO: 34 % — SIGNIFICANT CHANGE UP (ref 20.5–51.1)
MCHC RBC-ENTMCNC: 29.2 PG — SIGNIFICANT CHANGE UP (ref 27–31)
MCHC RBC-ENTMCNC: 32.5 G/DL — SIGNIFICANT CHANGE UP (ref 32–37)
MCV RBC AUTO: 89.9 FL — SIGNIFICANT CHANGE UP (ref 81–99)
MONOCYTES # BLD AUTO: 0.75 K/UL — HIGH (ref 0.1–0.6)
MONOCYTES NFR BLD AUTO: 8.2 % — SIGNIFICANT CHANGE UP (ref 1.7–9.3)
NEUTROPHILS # BLD AUTO: 4.97 K/UL — SIGNIFICANT CHANGE UP (ref 1.4–6.5)
NEUTROPHILS NFR BLD AUTO: 54.3 % — SIGNIFICANT CHANGE UP (ref 42.2–75.2)
NRBC # BLD: 0 /100 WBCS — SIGNIFICANT CHANGE UP (ref 0–0)
PLATELET # BLD AUTO: 312 K/UL — SIGNIFICANT CHANGE UP (ref 130–400)
POTASSIUM SERPL-MCNC: 5.2 MMOL/L — HIGH (ref 3.5–5)
POTASSIUM SERPL-SCNC: 5.2 MMOL/L — HIGH (ref 3.5–5)
PROT SERPL-MCNC: 7.6 G/DL — SIGNIFICANT CHANGE UP (ref 6–8)
RBC # BLD: 4.45 M/UL — SIGNIFICANT CHANGE UP (ref 4.2–5.4)
RBC # FLD: 13.3 % — SIGNIFICANT CHANGE UP (ref 11.5–14.5)
SODIUM SERPL-SCNC: 139 MMOL/L — SIGNIFICANT CHANGE UP (ref 135–146)
WBC # BLD: 9.17 K/UL — SIGNIFICANT CHANGE UP (ref 4.8–10.8)
WBC # FLD AUTO: 9.17 K/UL — SIGNIFICANT CHANGE UP (ref 4.8–10.8)

## 2019-06-15 PROCEDURE — 99284 EMERGENCY DEPT VISIT MOD MDM: CPT | Mod: 25

## 2019-06-15 PROCEDURE — 99283 EMERGENCY DEPT VISIT LOW MDM: CPT

## 2019-06-15 PROCEDURE — 71046 X-RAY EXAM CHEST 2 VIEWS: CPT | Mod: 26

## 2019-06-15 PROCEDURE — 76705 ECHO EXAM OF ABDOMEN: CPT | Mod: 26

## 2019-06-15 RX ORDER — SODIUM CHLORIDE 9 MG/ML
1000 INJECTION, SOLUTION INTRAVENOUS ONCE
Refills: 0 | Status: COMPLETED | OUTPATIENT
Start: 2019-06-15 | End: 2019-06-15

## 2019-06-15 RX ADMIN — SODIUM CHLORIDE 1000 MILLILITER(S): 9 INJECTION, SOLUTION INTRAVENOUS at 07:13

## 2019-06-15 NOTE — CONSULT NOTE ADULT - ASSESSMENT
Impression:  25 y/o female with RUQ abdominal pain / Biliary colic with Sonogram findings of Cholelithiasis.

## 2019-06-15 NOTE — ED PROVIDER NOTE - PROGRESS NOTE DETAILS
Patient is PERC negative. No fever and normal lung sounds. + RUQ tenderness. Endoresed from Dr. Cotto. One week of right upper quadrant pain. No fever. Does feel shortness of breath at times. On exam S1S2 rrr, lungs clear, abdomen is soft + epigastric and right upper abdominal tenderness. Pt seen and evaled by Dr. Lawrence of surgery- patient does not wantsurgery at this time and wants as out patient.

## 2019-06-15 NOTE — ED PROVIDER NOTE - PHYSICAL EXAMINATION
VITAL SIGNS: I have reviewed nursing notes and confirm.  CONSTITUTIONAL: well-appearing, non-toxic, obese female, NAD  SKIN: Warm dry, normal skin turgor  HEAD: NCAT  EYES: EOMI, PERRLA, no scleral icterus  ENT: Moist mucous membranes, normal pharynx with no erythema or exudates  NECK: Supple; non tender. Full ROM. No cervical LAD  CARD: RRR, no murmurs, rubs or gallops  RESP: clear to ausculation b/l.  No rales, rhonchi, or wheezing.  ABD: soft, + RUQ tenderness, no rebound or guarding. No CVA tenderness  EXT: Full ROM, no bony tenderness, no pedal edema, no calf tenderness  NEURO: normal motor. normal sensory. CN II-XII intact. Cerebellar testing normal. Normal gait.  PSYCH: Cooperative, appropriate.

## 2019-06-15 NOTE — ED PROVIDER NOTE - OBJECTIVE STATEMENT
23 y/o F no PMH who presents with RUQ pain for the past 4 days.  Patient reports pain as sharp, 7/10, worse with deep breathing, non-radiating, and improved with rest. Patient denies any fever or chills.  She is not short of breath but does have pain with deep breathing.  No history of gallstones or kidney stones.  LMP just ended.  No urinary symptoms.  Last BM normal.    No history of DVT or PE.  Patient does not smoke.

## 2019-06-15 NOTE — CONSULT NOTE ADULT - PROBLEM SELECTOR RECOMMENDATION 9
.  Patient seen & examined with Dr. Zhu in ER today and he states since No elevated WBC count , LFT's normal and no N/V, patient can be discharged home today from ER  with RX of PO Augmentin to take if pain worsens and patient instructed to F/U with him in 1-2 weeks to discuss interval Lap. Cholecystectomy.    - All questions were answered and plan discussed with patient and she agrees with plan.   - ER MD (Dr. Oseguera) made aware of Dr. Hernandez's plan.

## 2019-06-15 NOTE — CONSULT NOTE ADULT - SUBJECTIVE AND OBJECTIVE BOX
Patient presents in ER with complaint of RUQ and epigastric pain that has been intermittent after she eats fatty foods for a few weeks.    Patient ate a Hamburger and chips with salsa yesterday and pain again started last night and described and sharp and crampy to 10/10 on pain scale earlier.    Patient reports pain currently much less and patient denies being given any pain medications.    Patient denies N/V/D/C, fever, chills or tremors.  Patient denies urinary complaints.   Patient denies being told of having gallstones in the past.          Medical History:  No pertinent past medical history      Surgical History:  No significant past surgical history except for a small growth removed from her foot in the past.       Social History:  Single.  Denies Tobacco, alcohol or drug use.      Family History:  Non-contributory.      Medications:  Denies using home medications currently.       Allergies:   No Known Allergies or Intolerances.        Review of Systems:  As per HPI otherwise non-contributory.        PHYSICAL EXAM:    General: WD, obese female, Conversant in NAD.    Eyes: PERRLA, EOM intact, No scleral icterus.     Back: no spinal tenderness.     Respiratory: CTA B/L.    Cardiovascular:  S1 & S2, RRR.    Gastrointestinal: + BS, soft, no distention, Mild to moderate tenderness to deep palpation in RUQ,  no rebound or guarding or peritoneal signs.    Extremities: Free painless ROM, no C/C/E, no calf tenderness.     Neurological: No focal deficits.         LABS:                            13.0   9.17  )-----------( 312      ( 15 Jarrell 2019 07:06 )             40.0       06-15    139  |  104  |  19  ----------------------------<  105<H>  5.2<H>   |  27  |  0.5<L>    Ca    9.5      15 Jarrell 2019 07:06    TPro  7.6  /  Alb  4.2  /  TBili  0.4  /  DBili  x   /  AST  29  /  ALT  23  /  AlkPhos  109  06-15            US Abdomen Limited (06.15.19 @ 08:57)   EXAM:  US ABDOMEN LIMITED          PROCEDURE DATE:  06/15/2019      INTERPRETATION:  CLINICAL HISTORY: right upper quadrant pain.    COMPARISON: None.    PROCEDURE: Ultrasound of the right upper quadrant was performed.    FINDINGS:    LIVER:  Normal in contour and echogenicity measuring 14.3 cm in length.   No focal mass.    GALLBLADDER/BILIARY TREE:  Cholelithiasis. No wall thickening or   pericholecystic fluid.  Positive sonographic Rudolph's sign. No   intrahepatic biliary ductal dilatation. The common bile duct measures 5   mm, which is normal.     PANCREAS:  Visualized head and body are unremarkable. Remainder obscured   by overlying bowel gas.    KIDNEY:  Right kidney measures 10.2 cm in length. No hydronephrosis,   calculi or solid mass.    AORTA/IVC:  Visualized proximal portions unremarkable.    ASCITES:  None.    IMPRESSION:    Cholelithiasis with no wall thickening or pericholecystic fluid, however   with positive sonographic Rudolph's sign. Acute cholecystitis cannot be   excluded. Recommend clinical and laboratory correlation, and if   indicated, nuclear medicine HIDA scan can be obtained for further   evaluation.

## 2019-06-15 NOTE — ED PROVIDER NOTE - NS ED ROS FT
Constitutional:  No fever, chills, lethargy, or abnormal weight loss  Eyes:  No eye pain or visual changes  ENMT: No nasal discharge, no toothache, no sore throat. No neck pain or stiffness  Cardiac:  No chest pain or palpitations  Respiratory:  No cough or respiratory distress. Pain upon inspiration  GI:  + abdominal pain to RUQ, + nausea  :  No dysuria, frequency or burning.  MS:  No back or joint pain.  Neuro:  No headache. No numbness, weakness, or tingling.   Skin:  No skin rash  Except as documented in the HPI,  all other systems are negative

## 2019-06-15 NOTE — ED ADULT TRIAGE NOTE - CHIEF COMPLAINT QUOTE
pt complaining of pain under her right breast since tuesday with nausea and a feeling like " I can't take a deep breath".

## 2019-06-15 NOTE — ED PROVIDER NOTE - CLINICAL SUMMARY MEDICAL DECISION MAKING FREE TEXT BOX
Surgeon. Agreed that pt is suffering with bilary colic. Pt was offered admission and surgery. PT refused to be admitted. Will follow up with surgery as out pt.

## 2019-06-15 NOTE — CONSULT NOTE ADULT - ATTENDING COMMENTS
Patient seen and examined with surgery team in ER and discussed management plans with patient and ED attending. Patient does not want to have surgery at this time and will discuss with her parents. Follow up in office

## 2019-06-15 NOTE — ED PROVIDER NOTE - CARE PROVIDER_API CALL
Wicho Zhu)  Surgery  [address] [place] [id number]  Phone: [phone]  Fax: [phone]  Follow Up Time: 1-3 Days

## 2019-06-15 NOTE — ED PROVIDER NOTE - NSFOLLOWUPINSTRUCTIONS_ED_ALL_ED_FT
Follow up with your primary care doctor and surgeon in 1-2 days     Biliary Colic    WHAT YOU NEED TO KNOW:    Biliary colic is severe pain in your upper abdomen caused by a gallbladder problem. Your gallbladder stores bile, which helps break down the fats that you eat. Gallbladder         DISCHARGE INSTRUCTIONS:    Medicines:     Medicines can help decrease pain and muscle spasms. You may also need medicine to calm your stomach and stop vomiting.      Take your medicine as directed. Contact your healthcare provider if you think your medicine is not helping or you have side effects. Tell him if you are allergic to any medicine. Keep a list of the medicines, vitamins, and herbs you take. Include the amounts, and when and why you take them. Bring the list or the pill bottles to follow-up visits. Carry your medicine list with you in case of an emergency.    Avoid alcohol: Alcohol can damage your gallbladder and make your symptoms worse.    Maintain a healthy weight: Ask your healthcare provider how much you should weigh. Ask him to help you create a weight loss plan if you are overweight.     Eat a variety of healthy foods: Healthy foods include fruits, vegetables, whole-grain breads, low-fat dairy products, beans, lean meats, and fish. Foods that are high in fiber and low in fat and cholesterol may decrease your symptoms. Ask if you need to be on a special diet.    Exercise: Ask your healthcare provider about the best exercise plan for you. Exercise may help improve your symptoms.    Follow up with your healthcare provider as directed: Bring a list of any questions you have so you remember to ask them during your visits.     Contact your healthcare provider if:     You have a fever.      Your pain gets worse, even after you take medicine.      You have nausea or are vomiting.      Your skin or eyes are yellow.      You have questions or concerns about your condition or care.    Return to the emergency department if:     You have severe pain.      You feel like you are going to faint.      You are short of breath.         © Copyright [employer] 2019 All illustrations and images included in CareNotes are the copyrighted property of [employer] Inc[employer] or [employer].

## 2019-06-24 ENCOUNTER — APPOINTMENT (OUTPATIENT)
Dept: SURGERY | Facility: CLINIC | Age: 24
End: 2019-06-24
Payer: MEDICAID

## 2019-06-24 VITALS
DIASTOLIC BLOOD PRESSURE: 70 MMHG | BODY MASS INDEX: 34.55 KG/M2 | OXYGEN SATURATION: 98 % | SYSTOLIC BLOOD PRESSURE: 120 MMHG | HEIGHT: 60 IN | WEIGHT: 176 LBS | HEART RATE: 78 BPM

## 2019-06-24 DIAGNOSIS — Z78.9 OTHER SPECIFIED HEALTH STATUS: ICD-10-CM

## 2019-06-24 DIAGNOSIS — K80.20 CALCULUS OF GALLBLADDER W/OUT CHOLECYSTITIS W/OUT OBSTRUCTION: ICD-10-CM

## 2019-06-24 PROCEDURE — 99213 OFFICE O/P EST LOW 20 MIN: CPT

## 2019-06-24 NOTE — ASSESSMENT
[FreeTextEntry1] : 24-year-old female with symptomatic gallstones and paste was recommended to have laparoscopic cholecystectomy. All risks and benefits were explained to the patient and her friend who accompanied her. Informed consent was authenticated scheduled for elective laparoscopic cholecystectomy as an outpatient

## 2019-06-24 NOTE — PHYSICAL EXAM
[Normal Breath Sounds] : Normal breath sounds [Abdominal Masses] : No abdominal masses [Abdomen Tenderness] : ~T ~M No abdominal tenderness [No HSM] : no hepatosplenomegaly [de-identified] : Obese built female in no acute discomfort [de-identified] : Abdomen soft nondistended nontender umbilical area with slight bulging mainly in the left side of the umbilicus with slight protrusion of the fat suggestive of umbilical hernia

## 2019-06-24 NOTE — REASON FOR VISIT
[Post ER] : a Post ER visit [Other: _____] : [unfilled] [Source: ________] : History obtained from [unfilled] [FreeTextEntry1] : Marisol is being seen today for a follow up following emergency room visit

## 2019-06-24 NOTE — HISTORY OF PRESENT ILLNESS
[de-identified] : This 24-year-old female was seen in the hospital for acute onset of epigastric right upper quadrant pain and was found to have cholelithiasis. Patient was diagnosed as biliary colic as her pain had improved and was discharged for outpatient followup. Patient now comes to the office for decision regarding laparoscopic cholecystectomy [de-identified] : One episode of mild pain since discharge from the emergency room

## 2019-06-25 ENCOUNTER — MESSAGE (OUTPATIENT)
Age: 24
End: 2019-06-25

## 2019-06-28 ENCOUNTER — OUTPATIENT (OUTPATIENT)
Dept: OUTPATIENT SERVICES | Facility: HOSPITAL | Age: 24
LOS: 1 days | Discharge: HOME | End: 2019-06-28

## 2019-06-28 VITALS
HEIGHT: 60 IN | HEART RATE: 80 BPM | OXYGEN SATURATION: 98 % | SYSTOLIC BLOOD PRESSURE: 115 MMHG | WEIGHT: 174.83 LBS | DIASTOLIC BLOOD PRESSURE: 75 MMHG | TEMPERATURE: 99 F | RESPIRATION RATE: 18 BRPM

## 2019-06-28 DIAGNOSIS — Z98.890 OTHER SPECIFIED POSTPROCEDURAL STATES: Chronic | ICD-10-CM

## 2019-06-28 DIAGNOSIS — K42.9 UMBILICAL HERNIA WITHOUT OBSTRUCTION OR GANGRENE: ICD-10-CM

## 2019-06-28 DIAGNOSIS — Z01.818 ENCOUNTER FOR OTHER PREPROCEDURAL EXAMINATION: ICD-10-CM

## 2019-06-28 DIAGNOSIS — K80.20 CALCULUS OF GALLBLADDER WITHOUT CHOLECYSTITIS WITHOUT OBSTRUCTION: ICD-10-CM

## 2019-06-28 DIAGNOSIS — K08.409 PARTIAL LOSS OF TEETH, UNSPECIFIED CAUSE, UNSPECIFIED CLASS: Chronic | ICD-10-CM

## 2019-06-28 NOTE — H&P PST ADULT - REASON FOR ADMISSION
23 Y/O F  SCHEDULED FOR PAST FOR LAPAROSCOPIC CHOLECYSTECTOMY POSSIBLE OPEN POSSIBLE UMBILICAL HERNIA REPAIR ON 7/3/19

## 2019-06-28 NOTE — H&P PST ADULT - HISTORY OF PRESENT ILLNESS
CURRENTLY  DENIES ANY CP, SOB,PALPITATIONS,COUGH OR DYSURIA  EXERCISE TOLERANCE 1-2 FOS WITHOUT SOB      AS PER PATIENT  this is his/her complete medical history including medications - PRESCRIPTIONS  OVER THE COUNTER MEDS    WHEN HAVING GALLBLADDER ATTACK PT STATES PAIN STARTS RIGHT UPPER QUADRANT AND TRAVELS UP TO CHEST STATES SHE FEELS ANXIOUS DURING THAT TIME  HAS SOME NASAL CONGESTION- DENIES FEVER

## 2019-07-03 ENCOUNTER — APPOINTMENT (OUTPATIENT)
Dept: SURGERY | Facility: HOSPITAL | Age: 24
End: 2019-07-03

## 2019-07-03 ENCOUNTER — RESULT REVIEW (OUTPATIENT)
Age: 24
End: 2019-07-03

## 2019-07-03 ENCOUNTER — OUTPATIENT (OUTPATIENT)
Dept: OUTPATIENT SERVICES | Facility: HOSPITAL | Age: 24
LOS: 1 days | Discharge: HOME | End: 2019-07-03
Payer: MEDICAID

## 2019-07-03 VITALS
RESPIRATION RATE: 17 BRPM | OXYGEN SATURATION: 98 % | TEMPERATURE: 209 F | DIASTOLIC BLOOD PRESSURE: 60 MMHG | HEART RATE: 71 BPM | SYSTOLIC BLOOD PRESSURE: 106 MMHG | WEIGHT: 0.39 LBS | HEIGHT: 55 IN

## 2019-07-03 VITALS — SYSTOLIC BLOOD PRESSURE: 102 MMHG | RESPIRATION RATE: 18 BRPM | DIASTOLIC BLOOD PRESSURE: 59 MMHG | HEART RATE: 82 BPM

## 2019-07-03 DIAGNOSIS — K08.409 PARTIAL LOSS OF TEETH, UNSPECIFIED CAUSE, UNSPECIFIED CLASS: Chronic | ICD-10-CM

## 2019-07-03 DIAGNOSIS — Z98.890 OTHER SPECIFIED POSTPROCEDURAL STATES: Chronic | ICD-10-CM

## 2019-07-03 PROCEDURE — 49585: CPT | Mod: 59

## 2019-07-03 PROCEDURE — 47562 LAPAROSCOPIC CHOLECYSTECTOMY: CPT

## 2019-07-03 PROCEDURE — 88304 TISSUE EXAM BY PATHOLOGIST: CPT | Mod: 26

## 2019-07-03 PROCEDURE — 88302 TISSUE EXAM BY PATHOLOGIST: CPT | Mod: 26

## 2019-07-03 RX ORDER — ONDANSETRON 8 MG/1
4 TABLET, FILM COATED ORAL ONCE
Refills: 0 | Status: DISCONTINUED | OUTPATIENT
Start: 2019-07-03 | End: 2019-07-18

## 2019-07-03 RX ORDER — OXYCODONE AND ACETAMINOPHEN 5; 325 MG/1; MG/1
1 TABLET ORAL EVERY 4 HOURS
Refills: 0 | Status: DISCONTINUED | OUTPATIENT
Start: 2019-07-03 | End: 2019-07-03

## 2019-07-03 RX ORDER — MORPHINE SULFATE 50 MG/1
2 CAPSULE, EXTENDED RELEASE ORAL
Refills: 0 | Status: DISCONTINUED | OUTPATIENT
Start: 2019-07-03 | End: 2019-07-03

## 2019-07-03 RX ORDER — SODIUM CHLORIDE 9 MG/ML
1000 INJECTION, SOLUTION INTRAVENOUS
Refills: 0 | Status: DISCONTINUED | OUTPATIENT
Start: 2019-07-03 | End: 2019-07-18

## 2019-07-03 RX ORDER — OXYCODONE HYDROCHLORIDE 5 MG/1
1 TABLET ORAL
Qty: 12 | Refills: 0
Start: 2019-07-03 | End: 2019-07-05

## 2019-07-03 RX ORDER — MORPHINE SULFATE 50 MG/1
1 CAPSULE, EXTENDED RELEASE ORAL
Refills: 0 | Status: DISCONTINUED | OUTPATIENT
Start: 2019-07-03 | End: 2019-07-03

## 2019-07-03 RX ADMIN — MORPHINE SULFATE 2 MILLIGRAM(S): 50 CAPSULE, EXTENDED RELEASE ORAL at 13:39

## 2019-07-03 RX ADMIN — OXYCODONE AND ACETAMINOPHEN 1 TABLET(S): 5; 325 TABLET ORAL at 15:01

## 2019-07-03 NOTE — BRIEF OPERATIVE NOTE - NSICDXBRIEFPOSTOP_GEN_ALL_CORE_FT
POST-OP DIAGNOSIS:  Umbilical hernia 03-Jul-2019 13:30:44  Mariel Mendoza  Symptomatic cholelithiasis 03-Jul-2019 13:29:20  Mariel Mendoza

## 2019-07-03 NOTE — BRIEF OPERATIVE NOTE - NSICDXBRIEFPREOP_GEN_ALL_CORE_FT
PRE-OP DIAGNOSIS:  Umbilical hernia 03-Jul-2019 13:30:35  Mariel Mendoza  Symptomatic cholelithiasis 03-Jul-2019 13:29:09  Mariel Mendoza

## 2019-07-03 NOTE — ASU DISCHARGE PLAN (ADULT/PEDIATRIC) - MEDICATION INSTRUCTIONS
tylenol 650 mg po   8 hrs prn for mild pain and or ibuprfen 600 mg po q 8 hrs prn for moderate pain and or oxycodone 5 mgpo q 6 hrs prn for severe pain

## 2019-07-03 NOTE — ASU DISCHARGE PLAN (ADULT/PEDIATRIC) - CARE PROVIDER_API CALL
Wicho Zhu)  Surgery  [address] [place] [address]  Phone: [phone]  Fax: [phone]  Follow Up Time:

## 2019-07-03 NOTE — CHART NOTE - NSCHARTNOTEFT_GEN_A_CORE
PACU ANESTHESIA ADMISSION NOTE      Procedure:   Post op diagnosis:      ____  Intubated  TV:______       Rate: ______      FiO2: ______    __x__  Patent Airway    __x__  Full return of protective reflexes    __x__  Full recovery from anesthesia / back to baseline status    Vitals:  T(C): 36.9 (07-03-19 @ 10:22), Max: 98.4 (07-03-19 @ 08:33)  HR: 71 (07-03-19 @ 10:22) (71 - 71)  BP: 106/60 (07-03-19 @ 10:22) (106/60 - 106/60)  RR: 17 (07-03-19 @ 10:22) (17 - 17)  SpO2: 98% (07-03-19 @ 10:22) (98% - 98%)    Mental Status:  _x___ Awake   __x___ Alert   _____ Drowsy   _____ Sedated    Nausea/Vomiting:  ____ NO  ___x___Yes,   See Post - Op Orders          Pain Scale (0-10):  _____    Treatment: ____ None    __x__ See Post - Op/PCA Orders    Post - Operative Fluids:   ____ Oral   __x__ See Post - Op Orders    Plan: Discharge:   ___x_Home       _____Floor     _____Critical Care    _____  Other:_________________    Comments: uneventful anesthesia course no complications. VItals stable. Pt transferred to PACU

## 2019-07-03 NOTE — BRIEF OPERATIVE NOTE - NSICDXBRIEFPROCEDURE_GEN_ALL_CORE_FT
PROCEDURES:  Open repair of umbilical hernia in adult 03-Jul-2019 13:30:07  Mariel Mendoza  Cholecystectomy, laparoscopic 03-Jul-2019 13:28:51  Mariel Mendoza

## 2019-07-05 LAB — SURGICAL PATHOLOGY STUDY: SIGNIFICANT CHANGE UP

## 2019-07-06 DIAGNOSIS — K80.10 CALCULUS OF GALLBLADDER WITH CHRONIC CHOLECYSTITIS WITHOUT OBSTRUCTION: ICD-10-CM

## 2019-07-06 DIAGNOSIS — K42.9 UMBILICAL HERNIA WITHOUT OBSTRUCTION OR GANGRENE: ICD-10-CM

## 2019-07-08 ENCOUNTER — APPOINTMENT (OUTPATIENT)
Dept: SURGERY | Facility: CLINIC | Age: 24
End: 2019-07-08
Payer: MEDICAID

## 2019-07-08 VITALS
SYSTOLIC BLOOD PRESSURE: 116 MMHG | DIASTOLIC BLOOD PRESSURE: 98 MMHG | OXYGEN SATURATION: 98 % | WEIGHT: 174 LBS | HEART RATE: 113 BPM | HEIGHT: 60 IN | BODY MASS INDEX: 34.16 KG/M2

## 2019-07-08 DIAGNOSIS — K42.9 UMBILICAL HERNIA W/OUT OBSTRUCTION OR GANGRENE: ICD-10-CM

## 2019-07-08 PROCEDURE — 99024 POSTOP FOLLOW-UP VISIT: CPT

## 2019-08-26 ENCOUNTER — APPOINTMENT (OUTPATIENT)
Dept: SURGERY | Facility: CLINIC | Age: 24
End: 2019-08-26
Payer: MEDICAID

## 2019-08-26 VITALS — DIASTOLIC BLOOD PRESSURE: 75 MMHG | SYSTOLIC BLOOD PRESSURE: 110 MMHG | WEIGHT: 169 LBS

## 2019-08-26 DIAGNOSIS — Z48.89 ENCOUNTER FOR OTHER SPECIFIED SURGICAL AFTERCARE: ICD-10-CM

## 2019-08-26 PROCEDURE — 99024 POSTOP FOLLOW-UP VISIT: CPT

## 2019-08-26 RX ORDER — OXYCODONE 5 MG/1
5 TABLET ORAL
Qty: 12 | Refills: 0 | Status: DISCONTINUED | COMMUNITY
Start: 2019-07-03 | End: 2019-08-26

## 2019-08-26 NOTE — HISTORY OF PRESENT ILLNESS
[de-identified] : 24-year-old female had rectal the cholecystectomy and repair of umbilical hernia at the same time and was discharged on the same day. Patient's been doing all right except for more discomfort especially over the umbilical area. Normal eating and bowel function

## 2019-08-26 NOTE — DATA REVIEWED
[FreeTextEntry1] : Pathology report was reviewed the patient showing the umbilical hernia and chronic cholecystitis and cholelithiasis and cholesterolosis

## 2019-08-26 NOTE — PHYSICAL EXAM
[de-identified] : Physical female ambulatory no discomfort [de-identified] : Abdomen soft nondistended nontender. laparoscopic cholecystectomy scars healing well along its umbilical scar with no signs of infection. residual deformity of umbilicus will improve

## 2019-08-26 NOTE — PHYSICAL EXAM
[de-identified] : Physical female ambulatory no discomfort [de-identified] : Abdomen soft nondistended nontender field the post cholecystectomy scars along its umbilical scar with no signs of infection or hematoma

## 2019-08-26 NOTE — REASON FOR VISIT
[Source: ______] : History obtained from [unfilled] [FreeTextEntry1] : Marisol is here today for another postop op visit

## 2019-08-26 NOTE — HISTORY OF PRESENT ILLNESS
[de-identified] : 24-year-old female had elective laparoscopic cholecystectomy and repair of umbilical hernia at the same time and was discharged on the same day. Patient Has been doing well now with residual deformity of umbilicus. Normal eating and bowel function

## 2019-08-26 NOTE — ASSESSMENT
[FreeTextEntry1] : Satisfactory postoperative course for umbilical hernia and laparoscopic cholecystectomy. Patient was instructed to resume normal activity as tolerated return to the office  if necessary

## 2019-08-26 NOTE — REASON FOR VISIT
[Post Op: _________] : a [unfilled] post op visit [FreeTextEntry1] : Marisol is a p/o after lap cholecystectomy and umbical hernia postop ends 10/01/2019

## 2019-08-26 NOTE — REVIEW OF SYSTEMS
[Fever] : no fever [Chills] : no chills [Shortness Of Breath] : no shortness of breath [Chest Pain] : no chest pain [Vomiting] : no vomiting [Abdominal Pain] : no abdominal pain [Constipation] : no constipation [Diarrhea] : no diarrhea [Dizziness] : no dizziness

## 2019-08-26 NOTE — ASSESSMENT
[FreeTextEntry1] : Satisfactory postoperative course for umbilical hernia and possible cholecystectomy done. Patient was instructed increase activity as tolerated return to the office in one month if necessary

## 2019-09-27 ENCOUNTER — EMERGENCY (EMERGENCY)
Facility: HOSPITAL | Age: 24
LOS: 0 days | Discharge: HOME | End: 2019-09-28
Attending: EMERGENCY MEDICINE | Admitting: EMERGENCY MEDICINE
Payer: MEDICAID

## 2019-09-27 VITALS
HEART RATE: 87 BPM | DIASTOLIC BLOOD PRESSURE: 71 MMHG | TEMPERATURE: 98 F | RESPIRATION RATE: 17 BRPM | OXYGEN SATURATION: 99 % | SYSTOLIC BLOOD PRESSURE: 114 MMHG

## 2019-09-27 VITALS
OXYGEN SATURATION: 100 % | HEART RATE: 80 BPM | RESPIRATION RATE: 21 BRPM | TEMPERATURE: 99 F | DIASTOLIC BLOOD PRESSURE: 91 MMHG | SYSTOLIC BLOOD PRESSURE: 134 MMHG

## 2019-09-27 DIAGNOSIS — R10.31 RIGHT LOWER QUADRANT PAIN: ICD-10-CM

## 2019-09-27 DIAGNOSIS — R19.7 DIARRHEA, UNSPECIFIED: ICD-10-CM

## 2019-09-27 DIAGNOSIS — R11.0 NAUSEA: ICD-10-CM

## 2019-09-27 DIAGNOSIS — R10.13 EPIGASTRIC PAIN: ICD-10-CM

## 2019-09-27 DIAGNOSIS — R10.9 UNSPECIFIED ABDOMINAL PAIN: ICD-10-CM

## 2019-09-27 DIAGNOSIS — K08.409 PARTIAL LOSS OF TEETH, UNSPECIFIED CAUSE, UNSPECIFIED CLASS: Chronic | ICD-10-CM

## 2019-09-27 DIAGNOSIS — Z98.890 OTHER SPECIFIED POSTPROCEDURAL STATES: Chronic | ICD-10-CM

## 2019-09-27 DIAGNOSIS — Z90.49 ACQUIRED ABSENCE OF OTHER SPECIFIED PARTS OF DIGESTIVE TRACT: ICD-10-CM

## 2019-09-27 LAB
ALBUMIN SERPL ELPH-MCNC: 4.8 G/DL — SIGNIFICANT CHANGE UP (ref 3.5–5.2)
ALP SERPL-CCNC: 100 U/L — SIGNIFICANT CHANGE UP (ref 30–115)
ALT FLD-CCNC: 37 U/L — SIGNIFICANT CHANGE UP (ref 0–41)
ANION GAP SERPL CALC-SCNC: 12 MMOL/L — SIGNIFICANT CHANGE UP (ref 7–14)
APPEARANCE UR: ABNORMAL
AST SERPL-CCNC: 52 U/L — HIGH (ref 0–41)
BASE EXCESS BLDV CALC-SCNC: 0.2 MMOL/L — SIGNIFICANT CHANGE UP (ref -2–2)
BASOPHILS # BLD AUTO: 0.02 K/UL — SIGNIFICANT CHANGE UP (ref 0–0.2)
BASOPHILS NFR BLD AUTO: 0.2 % — SIGNIFICANT CHANGE UP (ref 0–1)
BILIRUB SERPL-MCNC: 2.2 MG/DL — HIGH (ref 0.2–1.2)
BILIRUB UR-MCNC: NEGATIVE — SIGNIFICANT CHANGE UP
BUN SERPL-MCNC: 19 MG/DL — SIGNIFICANT CHANGE UP (ref 10–20)
CA-I SERPL-SCNC: 1.17 MMOL/L — SIGNIFICANT CHANGE UP (ref 1.12–1.3)
CALCIUM SERPL-MCNC: 9.8 MG/DL — SIGNIFICANT CHANGE UP (ref 8.5–10.1)
CHLORIDE SERPL-SCNC: 103 MMOL/L — SIGNIFICANT CHANGE UP (ref 98–110)
CO2 SERPL-SCNC: 24 MMOL/L — SIGNIFICANT CHANGE UP (ref 17–32)
COLOR SPEC: YELLOW — SIGNIFICANT CHANGE UP
CREAT SERPL-MCNC: 0.8 MG/DL — SIGNIFICANT CHANGE UP (ref 0.7–1.5)
DIFF PNL FLD: ABNORMAL
EOSINOPHIL # BLD AUTO: 0.05 K/UL — SIGNIFICANT CHANGE UP (ref 0–0.7)
EOSINOPHIL NFR BLD AUTO: 0.6 % — SIGNIFICANT CHANGE UP (ref 0–8)
GAS PNL BLDV: 139 MMOL/L — SIGNIFICANT CHANGE UP (ref 136–145)
GAS PNL BLDV: SIGNIFICANT CHANGE UP
GLUCOSE SERPL-MCNC: 100 MG/DL — HIGH (ref 70–99)
GLUCOSE UR QL: NEGATIVE — SIGNIFICANT CHANGE UP
HCG SERPL QL: NEGATIVE — SIGNIFICANT CHANGE UP
HCO3 BLDV-SCNC: 25 MMOL/L — SIGNIFICANT CHANGE UP (ref 22–29)
HCT VFR BLD CALC: 42 % — SIGNIFICANT CHANGE UP (ref 37–47)
HCT VFR BLDA CALC: 39.1 % — SIGNIFICANT CHANGE UP (ref 34–44)
HGB BLD CALC-MCNC: 12.8 G/DL — LOW (ref 14–18)
HGB BLD-MCNC: 13.8 G/DL — SIGNIFICANT CHANGE UP (ref 12–16)
IMM GRANULOCYTES NFR BLD AUTO: 0.3 % — SIGNIFICANT CHANGE UP (ref 0.1–0.3)
KETONES UR-MCNC: ABNORMAL
LACTATE BLDV-MCNC: 1.4 MMOL/L — SIGNIFICANT CHANGE UP (ref 0.5–1.6)
LACTATE SERPL-SCNC: 1.5 MMOL/L — SIGNIFICANT CHANGE UP (ref 0.5–2.2)
LEUKOCYTE ESTERASE UR-ACNC: ABNORMAL
LIDOCAIN IGE QN: 22 U/L — SIGNIFICANT CHANGE UP (ref 7–60)
LYMPHOCYTES # BLD AUTO: 2.6 K/UL — SIGNIFICANT CHANGE UP (ref 1.2–3.4)
LYMPHOCYTES # BLD AUTO: 30.1 % — SIGNIFICANT CHANGE UP (ref 20.5–51.1)
MCHC RBC-ENTMCNC: 29.4 PG — SIGNIFICANT CHANGE UP (ref 27–31)
MCHC RBC-ENTMCNC: 32.9 G/DL — SIGNIFICANT CHANGE UP (ref 32–37)
MCV RBC AUTO: 89.6 FL — SIGNIFICANT CHANGE UP (ref 81–99)
MONOCYTES # BLD AUTO: 0.55 K/UL — SIGNIFICANT CHANGE UP (ref 0.1–0.6)
MONOCYTES NFR BLD AUTO: 6.4 % — SIGNIFICANT CHANGE UP (ref 1.7–9.3)
NEUTROPHILS # BLD AUTO: 5.4 K/UL — SIGNIFICANT CHANGE UP (ref 1.4–6.5)
NEUTROPHILS NFR BLD AUTO: 62.4 % — SIGNIFICANT CHANGE UP (ref 42.2–75.2)
NITRITE UR-MCNC: NEGATIVE — SIGNIFICANT CHANGE UP
NRBC # BLD: 0 /100 WBCS — SIGNIFICANT CHANGE UP (ref 0–0)
PCO2 BLDV: 40 MMHG — LOW (ref 41–51)
PH BLDV: 7.4 — SIGNIFICANT CHANGE UP (ref 7.26–7.43)
PH UR: 6.5 — SIGNIFICANT CHANGE UP (ref 5–8)
PLATELET # BLD AUTO: 360 K/UL — SIGNIFICANT CHANGE UP (ref 130–400)
PO2 BLDV: 33 MMHG — SIGNIFICANT CHANGE UP (ref 20–40)
POTASSIUM BLDV-SCNC: 3.6 MMOL/L — SIGNIFICANT CHANGE UP (ref 3.3–5.6)
POTASSIUM SERPL-MCNC: 5.9 MMOL/L — HIGH (ref 3.5–5)
POTASSIUM SERPL-SCNC: 5.9 MMOL/L — HIGH (ref 3.5–5)
PROT SERPL-MCNC: 8.6 G/DL — HIGH (ref 6–8)
PROT UR-MCNC: ABNORMAL
RBC # BLD: 4.69 M/UL — SIGNIFICANT CHANGE UP (ref 4.2–5.4)
RBC # FLD: 14.1 % — SIGNIFICANT CHANGE UP (ref 11.5–14.5)
SAO2 % BLDV: 64 % — SIGNIFICANT CHANGE UP
SODIUM SERPL-SCNC: 139 MMOL/L — SIGNIFICANT CHANGE UP (ref 135–146)
SP GR SPEC: 1.03 — HIGH (ref 1.01–1.02)
UROBILINOGEN FLD QL: SIGNIFICANT CHANGE UP
WBC # BLD: 8.65 K/UL — SIGNIFICANT CHANGE UP (ref 4.8–10.8)
WBC # FLD AUTO: 8.65 K/UL — SIGNIFICANT CHANGE UP (ref 4.8–10.8)

## 2019-09-27 PROCEDURE — 99284 EMERGENCY DEPT VISIT MOD MDM: CPT

## 2019-09-27 PROCEDURE — 93010 ELECTROCARDIOGRAM REPORT: CPT

## 2019-09-27 PROCEDURE — 74177 CT ABD & PELVIS W/CONTRAST: CPT | Mod: 26

## 2019-09-27 RX ORDER — SODIUM CHLORIDE 9 MG/ML
1000 INJECTION, SOLUTION INTRAVENOUS ONCE
Refills: 0 | Status: COMPLETED | OUTPATIENT
Start: 2019-09-27 | End: 2019-09-27

## 2019-09-27 RX ORDER — LIDOCAINE 4 G/100G
10 CREAM TOPICAL ONCE
Refills: 0 | Status: DISCONTINUED | OUTPATIENT
Start: 2019-09-27 | End: 2019-09-27

## 2019-09-27 RX ORDER — FAMOTIDINE 10 MG/ML
20 INJECTION INTRAVENOUS ONCE
Refills: 0 | Status: COMPLETED | OUTPATIENT
Start: 2019-09-27 | End: 2019-09-27

## 2019-09-27 RX ORDER — ONDANSETRON 8 MG/1
4 TABLET, FILM COATED ORAL ONCE
Refills: 0 | Status: COMPLETED | OUTPATIENT
Start: 2019-09-27 | End: 2019-09-27

## 2019-09-27 RX ORDER — ACETAMINOPHEN 500 MG
650 TABLET ORAL ONCE
Refills: 0 | Status: COMPLETED | OUTPATIENT
Start: 2019-09-27 | End: 2019-09-27

## 2019-09-27 RX ADMIN — Medication 650 MILLIGRAM(S): at 22:12

## 2019-09-27 RX ADMIN — SODIUM CHLORIDE 1000 MILLILITER(S): 9 INJECTION, SOLUTION INTRAVENOUS at 20:03

## 2019-09-27 RX ADMIN — Medication 650 MILLIGRAM(S): at 20:03

## 2019-09-27 RX ADMIN — FAMOTIDINE 20 MILLIGRAM(S): 10 INJECTION INTRAVENOUS at 20:02

## 2019-09-27 RX ADMIN — ONDANSETRON 4 MILLIGRAM(S): 8 TABLET, FILM COATED ORAL at 20:02

## 2019-09-27 RX ADMIN — Medication 30 MILLILITER(S): at 20:02

## 2019-09-27 NOTE — ED PROVIDER NOTE - CARE PROVIDERS DIRECT ADDRESSES
,[email or web address],DirectAddress_Unknown,[email or web address],DirectAddress_Unknown

## 2019-09-27 NOTE — ED PROVIDER NOTE - PROVIDER TOKENS
PROVIDER:[TOKEN:[[id number]]],PROVIDER:[TOKEN:[[id number]]],PROVIDER:[TOKEN:[[id number]]],FREE:[LAST:[your pmd],PHONE:[(   )    -],FAX:[(   )    -]]

## 2019-09-27 NOTE — ED PROVIDER NOTE - ATTENDING CONTRIBUTION TO CARE
25 yo F with pmh of cholecystitis s/p cholecystectomy by dr. sharma 7/2019, presents with epig pain and RLQ pain.  pt says since surgery she always has a little nausea and has been having daily soft stool.  always has a mild lower abd discomfort but became painful 2 weeks ago and is worsening.  no cp, no sob.  denies vaginal bleeding, vaginal discharge.  no h/o ovarian cysts or fibroids, but never seen gyn before.  pt denies vomiting, no fever, but has chills.  exam: nad, ncat, perrl, eomi, mmm, rrr, ctab, abd soft, ttp epig and RLQ, no rebound, mild guarding, no cvat,nd aox3, imp: pt with epig and RLQ pain, will check labs, denies gyn complaints, will ct a/p

## 2019-09-27 NOTE — ED PROVIDER NOTE - PATIENT PORTAL LINK FT
You can access the FollowMyHealth Patient Portal offered by [facility] by registering at the following website: [email or web address]. By joining [employer]’s FollowMyHealth portal, you will also be able to view your health information using other applications (apps) compatible with our system.

## 2019-09-27 NOTE — ED PROVIDER NOTE - PHYSICAL EXAMINATION
CONSTITUTIONAL: Well-developed; well-nourished; in no acute distress. Sitting up and providing appropriate history and physical examination  SKIN: skin exam is warm and dry, no acute rash.  HEAD: Normocephalic; atraumatic.  EYES: PERRL, 3 mm bilateral, no nystagmus, EOM intact; conjunctiva and sclera clear.  ENT: No nasal discharge; airway clear.  NECK: Supple; non tender. + full passive ROM in all directions. No JVD  CARD: S1, S2 normal; no murmurs, gallops, or rubs. Regular rate and rhythm. + Symmetric Strong Pulses  RESP: No wheezes, rales or rhonchi. Good air movement bilaterally  ABD: + RLQ tenderness, + Epigastric tenderness, soft; non-distended; non-tender. No Rebound, No Guarding, No signs of peritonitis, No CVA tenderness. No pulsatile abdominal mass. + Strong and Symmetric Pulses  EXT: Normal ROM. No clubbing, cyanosis or edema. Dp and Pt Pulses intact. Cap refill less than 3 seconds  NEURO: CN 2-12 intact, normal finger to nose, normal romberg, stable gait, no sensory or motor deficits, Alert, oriented, grossly unremarkable. No Focal deficits. GCS 15. NIH 0  PSYCH: Cooperative, appropriate.

## 2019-09-27 NOTE — ED ADULT TRIAGE NOTE - CHIEF COMPLAINT QUOTE
Pt complaining of headache and dizziness, and diarrhea x 2 days. Pt states she just feels "off" and is requesting to have blood work done.

## 2019-09-27 NOTE — ED ADULT NURSE NOTE - OBJECTIVE STATEMENT
Patient presents to ER complaining of sudden onset of lower abdomen pain with nonbloody diarrhea, +nausea, denies fever

## 2019-09-27 NOTE — ED PROVIDER NOTE - PROGRESS NOTE DETAILS
DEREK: Patient signed out to myself from Dr. Ibanez.  Patient is a 23 yo F with PMH of gallstones presenting to the ED c/o diffuse abd pain primarily to epigastric region x few months. but has been worse of the past two weeks associated with multiple episodes of NB diarrhea.  Patient had cholecystectomy done on July 3rd by Dr. Zhu with no complications and has been seen by him twice since surgery.  Denies N/V, fever, chest pain, sob. PE: NAD. nml S1S2. lungs clear. mild TTP to epigastric region. no leg swelling or TTP.  Patient reports feeling better. Discussed results with patient and importance to follow up with PMD, GI, and surgery as outpatient. Patient verbalizes understanding and agrees to outpatient plan. Return precautions given. Patient agreeable to discharge.

## 2019-09-27 NOTE — ED PROVIDER NOTE - CARE PROVIDER_API CALL
Wicho Zhu)  Surgery  [address] [place] [address]  Phone: [phone]  Fax: [phone]  Follow Up Time:     Jonathan Landon)  Gastroenterology; Internal Medicine  [address] [place] [address]  Phone: [phone]  Fax: [phone]  Follow Up Time:     Francia Tubbs)  Internal Medicine  [address] [place] [address]  Phone: [phone]  Fax: [phone]  Follow Up Time:     your pmd,   Phone: (   )    -  Fax: (   )    -  Follow Up Time:

## 2019-09-27 NOTE — ED PROVIDER NOTE - OBJECTIVE STATEMENT
24 y.o. F with PMH of cholecystitis s/p cholecystectomy performed in July with abdominal pain nausea and dizziness since the event happened. Denied N/V/ + Diarrhea nonbloody x5.

## 2019-09-27 NOTE — ED PROVIDER NOTE - NS ED ROS FT
Constitutional:  See HPI.   Eyes:  No visual changes, eye pain or discharge.  ENMT:  No hearing changes, pain, discharge or infections. No neck pain or stiffness.  Cardiac:  No chest pain, SOB or edema. No chest pain with exertion.  Respiratory:  No cough or respiratory distress. No hemoptysis.  GI:  No nausea, vomiting, + diarrhea, RLQ abdominal pain.  :  No dysuria, frequency, hematuria  MS:  No joint pain or back pain.  Neuro:  No LOC. No headache or weakness.    Skin:  No skin rash.  Except as in HPI, all other review of systems is negative

## 2019-09-27 NOTE — ED PROVIDER NOTE - NSFOLLOWUPINSTRUCTIONS_ED_ALL_ED_FT
Please call to make an appointment to follow up with your PMD and gastroenterologist as outpatient.  Also follow up with Dr. Zhu as outpatient.      Return to the ED is symptoms become worse.    Abdominal Pain    Many things can cause abdominal pain. . Your health care provider will do a physical exam to determine if there is a dangerous cause of your pain; blood tests and imaging can sometimes help determine the cause of your pain. However, in many cases, no cause may be found and you may need further testing as an outpatient. Monitor your abdominal pain for any changes.     SEEK IMMEDIATE MEDICAL CARE IF YOU HAVE ANY OF THE FOLLOWING SYMPTOMS: worsening abdominal pain, uncontrollable vomiting, profuse diarrhea, inability to have bowel movements or pass gas, black or bloody stools, fever accompanying chest pain or back pain, or fainting. These symptoms may represent a serious problem that is an emergency. Do not wait to see if the symptoms will go away. Get medical help right away. Call 911 and do not drive yourself to the hospital.    Diarrhea    Diarrhea is frequent loose or watery bowel movements that has many causes. Diarrhea can make you feel weak and cause you to become dehydrated.. Drink clear fluids to prevent dehydration. Eat bland, easy-to-digest foods as tolerated. monitor the amount of wet diapers or voids to ensure you or patient are well hydrated. If diarrhea persists more then 5 days follow up with pmd for possible stool cultures.    SEEK IMMEDIATE MEDICAL CARE IF YOU HAVE ANY OF THE FOLLOWING SYMPTOMS: high fevers, lightheadedness/dizziness, chest pain, black or bloody stools, shortness of breath, severe abdominal or back pain, or any signs of dehydration.

## 2019-09-27 NOTE — ED PROVIDER NOTE - CLINICAL SUMMARY MEDICAL DECISION MAKING FREE TEXT BOX
Patient was signed out to me by Dr. Polo pending CT reading. Patient remained stable, as per sign out, she refused GC/chlamydia prophylaxis and wanted testing only. Patient remained stable in ER, improved well during the course of ER stay. States is feeling lot better, want to go home and f/u as out-patient. Patient is awake, alert, o x 3, ambulatory comfortable, tolerated PO. Discussed with patient in detail about her clinical condition, results of the diagnostic studies and the need for close out-patient follow up. Detail aftercare instructions and return precautions are given.

## 2019-09-28 LAB
ALBUMIN SERPL ELPH-MCNC: 4.3 G/DL — SIGNIFICANT CHANGE UP (ref 3.5–5.2)
ALP SERPL-CCNC: 95 U/L — SIGNIFICANT CHANGE UP (ref 30–115)
ALT FLD-CCNC: 30 U/L — SIGNIFICANT CHANGE UP (ref 0–41)
ANION GAP SERPL CALC-SCNC: 13 MMOL/L — SIGNIFICANT CHANGE UP (ref 7–14)
AST SERPL-CCNC: 21 U/L — SIGNIFICANT CHANGE UP (ref 0–41)
BACTERIA # UR AUTO: SIGNIFICANT CHANGE UP
BILIRUB DIRECT SERPL-MCNC: 0.3 MG/DL — HIGH (ref 0–0.2)
BILIRUB INDIRECT FLD-MCNC: 1.7 MG/DL — HIGH (ref 0.2–1.2)
BILIRUB SERPL-MCNC: 2 MG/DL — HIGH (ref 0.2–1.2)
BUN SERPL-MCNC: 15 MG/DL — SIGNIFICANT CHANGE UP (ref 10–20)
CALCIUM SERPL-MCNC: 9.1 MG/DL — SIGNIFICANT CHANGE UP (ref 8.5–10.1)
CHLORIDE SERPL-SCNC: 103 MMOL/L — SIGNIFICANT CHANGE UP (ref 98–110)
CO2 SERPL-SCNC: 23 MMOL/L — SIGNIFICANT CHANGE UP (ref 17–32)
CREAT SERPL-MCNC: 0.6 MG/DL — LOW (ref 0.7–1.5)
EPI CELLS # UR: SIGNIFICANT CHANGE UP /HPF (ref 0–5)
GLUCOSE SERPL-MCNC: 113 MG/DL — HIGH (ref 70–99)
POTASSIUM SERPL-MCNC: 3.6 MMOL/L — SIGNIFICANT CHANGE UP (ref 3.5–5)
POTASSIUM SERPL-SCNC: 3.6 MMOL/L — SIGNIFICANT CHANGE UP (ref 3.5–5)
PROT SERPL-MCNC: 6.9 G/DL — SIGNIFICANT CHANGE UP (ref 6–8)
RBC CASTS # UR COMP ASSIST: SIGNIFICANT CHANGE UP /HPF (ref 0–4)
SODIUM SERPL-SCNC: 139 MMOL/L — SIGNIFICANT CHANGE UP (ref 135–146)
WBC UR QL: SIGNIFICANT CHANGE UP /HPF (ref 0–5)

## 2019-09-28 RX ORDER — NITROFURANTOIN MACROCRYSTAL 50 MG
1 CAPSULE ORAL
Qty: 10 | Refills: 0
Start: 2019-09-28 | End: 2019-10-02

## 2019-09-30 LAB
C TRACH RRNA SPEC QL NAA+PROBE: SIGNIFICANT CHANGE UP
N GONORRHOEA RRNA SPEC QL NAA+PROBE: SIGNIFICANT CHANGE UP
SPECIMEN SOURCE: SIGNIFICANT CHANGE UP

## 2019-10-24 ENCOUNTER — OUTPATIENT (OUTPATIENT)
Dept: OUTPATIENT SERVICES | Facility: HOSPITAL | Age: 24
LOS: 1 days | Discharge: HOME | End: 2019-10-24

## 2019-10-24 DIAGNOSIS — Z98.890 OTHER SPECIFIED POSTPROCEDURAL STATES: Chronic | ICD-10-CM

## 2019-10-24 DIAGNOSIS — K08.409 PARTIAL LOSS OF TEETH, UNSPECIFIED CAUSE, UNSPECIFIED CLASS: Chronic | ICD-10-CM

## 2019-10-25 DIAGNOSIS — Z13.228 ENCOUNTER FOR SCREENING FOR OTHER METABOLIC DISORDERS: ICD-10-CM

## 2019-10-25 DIAGNOSIS — Z13.21 ENCOUNTER FOR SCREENING FOR NUTRITIONAL DISORDER: ICD-10-CM

## 2019-10-25 DIAGNOSIS — Z11.3 ENCOUNTER FOR SCREENING FOR INFECTIONS WITH A PREDOMINANTLY SEXUAL MODE OF TRANSMISSION: ICD-10-CM

## 2019-10-25 DIAGNOSIS — R82.90 UNSPECIFIED ABNORMAL FINDINGS IN URINE: ICD-10-CM

## 2019-10-25 DIAGNOSIS — Z00.00 ENCOUNTER FOR GENERAL ADULT MEDICAL EXAMINATION WITHOUT ABNORMAL FINDINGS: ICD-10-CM

## 2019-10-25 DIAGNOSIS — Z13.0 ENCOUNTER FOR SCREENING FOR DISEASES OF THE BLOOD AND BLOOD-FORMING ORGANS AND CERTAIN DISORDERS INVOLVING THE IMMUNE MECHANISM: ICD-10-CM

## 2019-10-25 DIAGNOSIS — Z13.220 ENCOUNTER FOR SCREENING FOR LIPOID DISORDERS: ICD-10-CM

## 2020-01-06 ENCOUNTER — EMERGENCY (EMERGENCY)
Facility: HOSPITAL | Age: 25
LOS: 0 days | Discharge: HOME | End: 2020-01-06
Attending: EMERGENCY MEDICINE | Admitting: EMERGENCY MEDICINE
Payer: MEDICAID

## 2020-01-06 VITALS
HEART RATE: 104 BPM | RESPIRATION RATE: 19 BRPM | TEMPERATURE: 98 F | WEIGHT: 164.91 LBS | DIASTOLIC BLOOD PRESSURE: 93 MMHG | OXYGEN SATURATION: 100 % | SYSTOLIC BLOOD PRESSURE: 133 MMHG

## 2020-01-06 VITALS — HEART RATE: 88 BPM

## 2020-01-06 DIAGNOSIS — R50.9 FEVER, UNSPECIFIED: ICD-10-CM

## 2020-01-06 DIAGNOSIS — R51 HEADACHE: ICD-10-CM

## 2020-01-06 DIAGNOSIS — B34.9 VIRAL INFECTION, UNSPECIFIED: ICD-10-CM

## 2020-01-06 DIAGNOSIS — Z91.018 ALLERGY TO OTHER FOODS: ICD-10-CM

## 2020-01-06 DIAGNOSIS — K08.409 PARTIAL LOSS OF TEETH, UNSPECIFIED CAUSE, UNSPECIFIED CLASS: Chronic | ICD-10-CM

## 2020-01-06 DIAGNOSIS — Z98.890 OTHER SPECIFIED POSTPROCEDURAL STATES: ICD-10-CM

## 2020-01-06 DIAGNOSIS — R53.1 WEAKNESS: ICD-10-CM

## 2020-01-06 DIAGNOSIS — Z98.890 OTHER SPECIFIED POSTPROCEDURAL STATES: Chronic | ICD-10-CM

## 2020-01-06 PROCEDURE — 99283 EMERGENCY DEPT VISIT LOW MDM: CPT

## 2020-01-06 RX ORDER — ONDANSETRON 8 MG/1
1 TABLET, FILM COATED ORAL
Qty: 21 | Refills: 0
Start: 2020-01-06 | End: 2020-01-12

## 2020-01-06 RX ORDER — METOCLOPRAMIDE HCL 10 MG
20 TABLET ORAL ONCE
Refills: 0 | Status: COMPLETED | OUTPATIENT
Start: 2020-01-06 | End: 2020-01-06

## 2020-01-06 RX ORDER — DEXAMETHASONE 0.5 MG/5ML
10 ELIXIR ORAL ONCE
Refills: 0 | Status: COMPLETED | OUTPATIENT
Start: 2020-01-06 | End: 2020-01-06

## 2020-01-06 RX ADMIN — Medication 10 MILLIGRAM(S): at 14:48

## 2020-01-06 RX ADMIN — Medication 20 MILLIGRAM(S): at 14:48

## 2020-01-06 NOTE — ED PROVIDER NOTE - CLINICAL SUMMARY MEDICAL DECISION MAKING FREE TEXT BOX
Rec continue supportive care,   Cont Tylenol or Motrin as needed,  Follow up with PMD. Pt instructed to return if any worsening symptoms or concerns.  They verbalize understanding.

## 2020-01-06 NOTE — ED PROVIDER NOTE - PROGRESS NOTE DETAILS
pt given reglan and decadron. pt advised to take tylenol/motrin at home PRN. pt zofran. discussed flu like symptoms with pt. pt advised to f/u with pcp. pt is advised of return precautions. pt is agreeable to dc.

## 2020-01-06 NOTE — ED PROVIDER NOTE - PATIENT PORTAL LINK FT
You can access the FollowMyHealth Patient Portal offered by [facility] by registering at the following website: [email or web address]. By joining [employer]’s FollowMyHealth portal, you will also be able to view your health information using other applications (apps) compatible with our system.

## 2020-01-06 NOTE — ED PROVIDER NOTE - OBJECTIVE STATEMENT
25 y/o female with no significant PMH presents to the ED for evaluation of headaches and weakness x 4 days. Pt admits to taking nyquil which provided temporary relief. Pt admits to nonbloody diarrhea x 4 days. Pt admits to 1 episode of nonbilious nonbloody vomiting today. pt admits to sore throat with swallowing, headaches, and loss of appetite. Pt denies fever, chills, abdominal pain, chest pain, sob, back pain, nasal congestion, cough, urinary symptoms, recent sick contacts, or recent travel.

## 2020-01-06 NOTE — ED PROVIDER NOTE - ATTENDING CONTRIBUTION TO CARE
23 yo F presents with c/o weakness x 4 days, + fevers, sore throat, diarrhea, headache.  Today patient with nausea, vomiting x 1.  Pt taking Nyquil with relief.  no abd pain, no CP, no SOB, no rash.  On exam pt in NAD AAo x 3, Op with erythema, + tonsillar hypertrophy, no exudates, + lad, Lungs cta b/l no wrr, no edema, abd soft nt nd

## 2020-01-06 NOTE — ED PROVIDER NOTE - NSFOLLOWUPINSTRUCTIONS_ED_ALL_ED_FT
Viral Illness, Adult  Viruses are tiny germs that can get into a person's body and cause illness. There are many different types of viruses, and they cause many types of illness. Viral illnesses can range from mild to severe. They can affect various parts of the body.    Common illnesses that are caused by a virus include colds and the flu. Viral illnesses also include serious conditions such as HIV/AIDS (human immunodeficiency virus/acquired immunodeficiency syndrome). A few viruses have been linked to certain cancers.    What are the causes?  Many types of viruses can cause illness. Viruses invade cells in your body, multiply, and cause the infected cells to malfunction or die. When the cell dies, it releases more of the virus. When this happens, you develop symptoms of the illness, and the virus continues to spread to other cells. If the virus takes over the function of the cell, it can cause the cell to divide and grow out of control, as is the case when a virus causes cancer.    Different viruses get into the body in different ways. You can get a virus by:  Swallowing food or water that is contaminated with the virus.  Breathing in droplets that have been coughed or sneezed into the air by an infected person.  Touching a surface that has been contaminated with the virus and then touching your eyes, nose, or mouth.  Being bitten by an insect or animal that carries the virus.  Having sexual contact with a person who is infected with the virus.  Being exposed to blood or fluids that contain the virus, either through an open cut or during a transfusion.  If a virus enters your body, your body's defense system (immune system) will try to fight the virus. You may be at higher risk for a viral illness if your immune system is weak.    What are the signs or symptoms?  Symptoms vary depending on the type of virus and the location of the cells that it invades. Common symptoms of the main types of viral illnesses include:    Cold and flu viruses     Fever.  Headache.  Sore throat.  Muscle aches.  Nasal congestion.  Cough.  Digestive system (gastrointestinal) viruses     Fever.  Abdominal pain.  Nausea.  Diarrhea.  Liver viruses (hepatitis)     Loss of appetite.  Tiredness.  Yellowing of the skin (jaundice).  Brain and spinal cord viruses     Fever.  Headache.  Stiff neck.  Nausea and vomiting.  Confusion or sleepiness.  Skin viruses     Warts.  Itching.  Rash.  Sexually transmitted viruses     Discharge.  Swelling.  Redness.  Rash.  How is this treated?  Viruses can be difficult to treat because they live within cells. Antibiotic medicines do not treat viruses because these drugs do not get inside cells. Treatment for a viral illness may include:  Resting and drinking plenty of fluids.  Medicines to relieve symptoms. These can include over-the-counter medicine for pain and fever, medicines for cough or congestion, and medicines to relieve diarrhea.  Antiviral medicines. These drugs are available only for certain types of viruses. They may help reduce flu symptoms if taken early. There are also many antiviral medicines for hepatitis and HIV/AIDS.  Some viral illnesses can be prevented with vaccinations. A common example is the flu shot.    Follow these instructions at home:  Medicines     Image   Take over-the-counter and prescription medicines only as told by your health care provider.  If you were prescribed an antiviral medicine, take it as told by your health care provider. Do not stop taking the medicine even if you start to feel better.  Be aware of when antibiotics are needed and when they are not needed. Antibiotics do not treat viruses. If your health care provider thinks that you may have a bacterial infection as well as a viral infection, you may get an antibiotic.  Do not ask for an antibiotic prescription if you have been diagnosed with a viral illness. That will not make your illness go away faster.  Frequently taking antibiotics when they are not needed can lead to antibiotic resistance. When this develops, the medicine no longer works against the bacteria that it normally fights.  General instructions     Drink enough fluids to keep your urine clear or pale yellow.  Rest as much as possible.  Return to your normal activities as told by your health care provider. Ask your health care provider what activities are safe for you.  Keep all follow-up visits as told by your health care provider. This is important.  How is this prevented?  Take these actions to reduce your risk of viral infection:Image  Eat a healthy diet and get enough rest.  Wash your hands often with soap and water. This is especially important when you are in public places. If soap and water are not available, use hand .  Avoid close contact with friends and family who have a viral illness.  If you travel to areas where viral gastrointestinal infection is common, avoid drinking water or eating raw food.  Keep your immunizations up to date. Get a flu shot every year as told by your health care provider.  Do not share toothbrushes, nail clippers, razors, or needles with other people.  Always practice safe sex.  Contact a health care provider if:  You have symptoms of a viral illness that do not go away.  Your symptoms come back after going away.  Your symptoms get worse.  Get help right away if:  You have trouble breathing.  You have a severe headache or a stiff neck.  You have severe vomiting or abdominal pain.  This information is not intended to replace advice given to you by your health care provider. Make sure you discuss any questions you have with your health care provider.    pt given reglan and decadron. pt advised to take tylenol/motrin at home PRN. pt zofran. discussed flu like symptoms with pt. pt advised to f/u with pcp. pt is advised of return precautions. pt is agreeable to dc.

## 2020-01-06 NOTE — ED PROVIDER NOTE - NS ED ROS FT
CONST: No fever, chills or bodyaches  EYES: No pain, redness, drainage or visual changes.  ENT: No ear pain or discharge, nasal discharge or congestion. (+) sore throat  CARD: No chest pain, palpitations  RESP: No SOB, cough, hemoptysis. No hx of asthma or COPD  GI: (+) vomiting, diarrhea, nausea. No abdominal pain  : No urinary symptoms   MS: No joint pain, back pain or extremity pain/injury  SKIN: No rashes  NEURO: No headache, dizziness, paresthesias or LOC

## 2020-01-06 NOTE — ED PROVIDER NOTE - PHYSICAL EXAMINATION
Physical Exam    Vital Signs: I have reviewed the initial vital signs.  Constitutional: well-nourished, appears stated age, no acute distress  Eyes: Conjunctiva pink, Sclera clear.   ENT: Oropharynx clear without exudates or erythema. tonsils enlarged b/l. (+) submandibular LAD. Cerumen impaction b/l without mastoid tenderness or erythema b/l.   Cardiovascular: regular rate, regular rhythm, well-perfused extremities, radial pulses equal and 2+  Respiratory: unlabored respiratory effort, clear to auscultation bilaterally no wheezing, rales and rhonchi  Gastrointestinal: soft, non-tender abdomen, no pulsatile mass.   Integumentary: warm, dry, no rash  Neurologic: a&o x3  Psychiatric: appropriate mood, appropriate affect

## 2020-02-03 NOTE — ED PROVIDER NOTE - NS ED MD DISPO DISCHARGE
Home Island Pedicle Flap With Canthal Suspension Text: The defect edges were debeveled with a #15 scalpel blade.  Given the location of the defect, shape of the defect and the proximity to free margins an island pedicle advancement flap was deemed most appropriate.  Using a sterile surgical marker, an appropriate advancement flap was drawn incorporating the defect, outlining the appropriate donor tissue and placing the expected incisions within the relaxed skin tension lines where possible. The area thus outlined was incised deep to adipose tissue with a #15 scalpel blade.  The skin margins were undermined to an appropriate distance in all directions around the primary defect and laterally outward around the island pedicle utilizing iris scissors.  There was minimal undermining beneath the pedicle flap. A suspension suture was placed in the canthal tendon to prevent tension and prevent ectropion.

## 2021-02-24 ENCOUNTER — EMERGENCY (EMERGENCY)
Facility: HOSPITAL | Age: 26
LOS: 0 days | Discharge: HOME | End: 2021-02-24
Attending: EMERGENCY MEDICINE | Admitting: EMERGENCY MEDICINE
Payer: MEDICAID

## 2021-02-24 VITALS
TEMPERATURE: 98 F | RESPIRATION RATE: 18 BRPM | SYSTOLIC BLOOD PRESSURE: 106 MMHG | OXYGEN SATURATION: 98 % | HEART RATE: 69 BPM | DIASTOLIC BLOOD PRESSURE: 70 MMHG

## 2021-02-24 VITALS
HEIGHT: 60 IN | OXYGEN SATURATION: 100 % | HEART RATE: 70 BPM | DIASTOLIC BLOOD PRESSURE: 75 MMHG | TEMPERATURE: 100 F | RESPIRATION RATE: 18 BRPM | SYSTOLIC BLOOD PRESSURE: 123 MMHG

## 2021-02-24 DIAGNOSIS — R10.33 PERIUMBILICAL PAIN: ICD-10-CM

## 2021-02-24 DIAGNOSIS — N39.0 URINARY TRACT INFECTION, SITE NOT SPECIFIED: ICD-10-CM

## 2021-02-24 DIAGNOSIS — Z98.890 OTHER SPECIFIED POSTPROCEDURAL STATES: Chronic | ICD-10-CM

## 2021-02-24 DIAGNOSIS — K08.409 PARTIAL LOSS OF TEETH, UNSPECIFIED CAUSE, UNSPECIFIED CLASS: Chronic | ICD-10-CM

## 2021-02-24 DIAGNOSIS — Z91.018 ALLERGY TO OTHER FOODS: ICD-10-CM

## 2021-02-24 DIAGNOSIS — R50.9 FEVER, UNSPECIFIED: ICD-10-CM

## 2021-02-24 DIAGNOSIS — Z90.49 ACQUIRED ABSENCE OF OTHER SPECIFIED PARTS OF DIGESTIVE TRACT: ICD-10-CM

## 2021-02-24 DIAGNOSIS — R10.9 UNSPECIFIED ABDOMINAL PAIN: ICD-10-CM

## 2021-02-24 LAB
ALBUMIN SERPL ELPH-MCNC: 4.5 G/DL — SIGNIFICANT CHANGE UP (ref 3.5–5.2)
ALP SERPL-CCNC: 91 U/L — SIGNIFICANT CHANGE UP (ref 30–115)
ALT FLD-CCNC: 26 U/L — SIGNIFICANT CHANGE UP (ref 0–41)
ANION GAP SERPL CALC-SCNC: 9 MMOL/L — SIGNIFICANT CHANGE UP (ref 7–14)
APPEARANCE UR: ABNORMAL
AST SERPL-CCNC: 22 U/L — SIGNIFICANT CHANGE UP (ref 0–41)
BACTERIA # UR AUTO: ABNORMAL
BASOPHILS # BLD AUTO: 0.05 K/UL — SIGNIFICANT CHANGE UP (ref 0–0.2)
BASOPHILS NFR BLD AUTO: 0.7 % — SIGNIFICANT CHANGE UP (ref 0–1)
BILIRUB DIRECT SERPL-MCNC: 0.2 MG/DL — SIGNIFICANT CHANGE UP (ref 0–0.2)
BILIRUB INDIRECT FLD-MCNC: 0.9 MG/DL — SIGNIFICANT CHANGE UP (ref 0.2–1.2)
BILIRUB SERPL-MCNC: 1.1 MG/DL — SIGNIFICANT CHANGE UP (ref 0.2–1.2)
BILIRUB UR-MCNC: NEGATIVE — SIGNIFICANT CHANGE UP
BUN SERPL-MCNC: 15 MG/DL — SIGNIFICANT CHANGE UP (ref 10–20)
CALCIUM SERPL-MCNC: 9.4 MG/DL — SIGNIFICANT CHANGE UP (ref 8.5–10.1)
CHLORIDE SERPL-SCNC: 104 MMOL/L — SIGNIFICANT CHANGE UP (ref 98–110)
CO2 SERPL-SCNC: 27 MMOL/L — SIGNIFICANT CHANGE UP (ref 17–32)
COLOR SPEC: YELLOW — SIGNIFICANT CHANGE UP
COMMENT - URINE: SIGNIFICANT CHANGE UP
COMMENT - URINE: SIGNIFICANT CHANGE UP
CREAT SERPL-MCNC: 0.6 MG/DL — LOW (ref 0.7–1.5)
DIFF PNL FLD: ABNORMAL
EOSINOPHIL # BLD AUTO: 0.1 K/UL — SIGNIFICANT CHANGE UP (ref 0–0.7)
EOSINOPHIL NFR BLD AUTO: 1.4 % — SIGNIFICANT CHANGE UP (ref 0–8)
EPI CELLS # UR: 16 /HPF — HIGH (ref 0–5)
GLUCOSE SERPL-MCNC: 89 MG/DL — SIGNIFICANT CHANGE UP (ref 70–99)
GLUCOSE UR QL: NEGATIVE — SIGNIFICANT CHANGE UP
HCG SERPL QL: NEGATIVE — SIGNIFICANT CHANGE UP
HCT VFR BLD CALC: 39.8 % — SIGNIFICANT CHANGE UP (ref 37–47)
HGB BLD-MCNC: 13.2 G/DL — SIGNIFICANT CHANGE UP (ref 12–16)
HYALINE CASTS # UR AUTO: 13 /LPF — HIGH (ref 0–7)
IMM GRANULOCYTES NFR BLD AUTO: 0.1 % — SIGNIFICANT CHANGE UP (ref 0.1–0.3)
KETONES UR-MCNC: NEGATIVE — SIGNIFICANT CHANGE UP
LACTATE SERPL-SCNC: 0.9 MMOL/L — SIGNIFICANT CHANGE UP (ref 0.7–2)
LEUKOCYTE ESTERASE UR-ACNC: ABNORMAL
LIDOCAIN IGE QN: 17 U/L — SIGNIFICANT CHANGE UP (ref 7–60)
LYMPHOCYTES # BLD AUTO: 2.45 K/UL — SIGNIFICANT CHANGE UP (ref 1.2–3.4)
LYMPHOCYTES # BLD AUTO: 35.1 % — SIGNIFICANT CHANGE UP (ref 20.5–51.1)
MCHC RBC-ENTMCNC: 29.7 PG — SIGNIFICANT CHANGE UP (ref 27–31)
MCHC RBC-ENTMCNC: 33.2 G/DL — SIGNIFICANT CHANGE UP (ref 32–37)
MCV RBC AUTO: 89.6 FL — SIGNIFICANT CHANGE UP (ref 81–99)
MONOCYTES # BLD AUTO: 0.43 K/UL — SIGNIFICANT CHANGE UP (ref 0.1–0.6)
MONOCYTES NFR BLD AUTO: 6.2 % — SIGNIFICANT CHANGE UP (ref 1.7–9.3)
NEUTROPHILS # BLD AUTO: 3.95 K/UL — SIGNIFICANT CHANGE UP (ref 1.4–6.5)
NEUTROPHILS NFR BLD AUTO: 56.5 % — SIGNIFICANT CHANGE UP (ref 42.2–75.2)
NITRITE UR-MCNC: POSITIVE
NRBC # BLD: 0 /100 WBCS — SIGNIFICANT CHANGE UP (ref 0–0)
PH UR: 6.5 — SIGNIFICANT CHANGE UP (ref 5–8)
PLATELET # BLD AUTO: 329 K/UL — SIGNIFICANT CHANGE UP (ref 130–400)
POTASSIUM SERPL-MCNC: 4.5 MMOL/L — SIGNIFICANT CHANGE UP (ref 3.5–5)
POTASSIUM SERPL-SCNC: 4.5 MMOL/L — SIGNIFICANT CHANGE UP (ref 3.5–5)
PROT SERPL-MCNC: 7.4 G/DL — SIGNIFICANT CHANGE UP (ref 6–8)
PROT UR-MCNC: SIGNIFICANT CHANGE UP
RBC # BLD: 4.44 M/UL — SIGNIFICANT CHANGE UP (ref 4.2–5.4)
RBC # FLD: 13.4 % — SIGNIFICANT CHANGE UP (ref 11.5–14.5)
RBC CASTS # UR COMP ASSIST: 10 /HPF — HIGH (ref 0–4)
SODIUM SERPL-SCNC: 140 MMOL/L — SIGNIFICANT CHANGE UP (ref 135–146)
SP GR SPEC: 1.03 — SIGNIFICANT CHANGE UP (ref 1.01–1.03)
UROBILINOGEN FLD QL: SIGNIFICANT CHANGE UP
WBC # BLD: 6.99 K/UL — SIGNIFICANT CHANGE UP (ref 4.8–10.8)
WBC # FLD AUTO: 6.99 K/UL — SIGNIFICANT CHANGE UP (ref 4.8–10.8)
WBC UR QL: 6 /HPF — HIGH (ref 0–5)

## 2021-02-24 PROCEDURE — 99285 EMERGENCY DEPT VISIT HI MDM: CPT

## 2021-02-24 PROCEDURE — 74177 CT ABD & PELVIS W/CONTRAST: CPT | Mod: 26

## 2021-02-24 RX ORDER — SODIUM CHLORIDE 9 MG/ML
1000 INJECTION INTRAMUSCULAR; INTRAVENOUS; SUBCUTANEOUS ONCE
Refills: 0 | Status: DISCONTINUED | OUTPATIENT
Start: 2021-02-24 | End: 2021-02-24

## 2021-02-24 RX ORDER — SODIUM CHLORIDE 9 MG/ML
1000 INJECTION INTRAMUSCULAR; INTRAVENOUS; SUBCUTANEOUS ONCE
Refills: 0 | Status: COMPLETED | OUTPATIENT
Start: 2021-02-24 | End: 2021-02-24

## 2021-02-24 RX ORDER — NITROFURANTOIN MACROCRYSTAL 50 MG
1 CAPSULE ORAL
Qty: 10 | Refills: 0
Start: 2021-02-24 | End: 2021-02-28

## 2021-02-24 RX ORDER — MORPHINE SULFATE 50 MG/1
4 CAPSULE, EXTENDED RELEASE ORAL ONCE
Refills: 0 | Status: DISCONTINUED | OUTPATIENT
Start: 2021-02-24 | End: 2021-02-24

## 2021-02-24 RX ADMIN — SODIUM CHLORIDE 1000 MILLILITER(S): 9 INJECTION INTRAMUSCULAR; INTRAVENOUS; SUBCUTANEOUS at 15:33

## 2021-02-24 NOTE — ED PROVIDER NOTE - OBJECTIVE STATEMENT
26 year old F with hx of cholecystectomy/umbilical hernia repair 2019 c/o diarrhea/lower abd pain x 3 months. Pt sts has 3-4 episodes of watery non bloody diarrhea a day with assoc crampy lower abdominal pain. Pt also sts now having discomfort around her periumbilical region x 1 week with assoc nausea. Sts had fever 100F x 2 days ago. Pt recently tested covid negative. Denies any assoc chills, bloody stools, vomiting, decreased po intake, urinary symptoms, recent travel, sick contacts, recent abx use.

## 2021-02-24 NOTE — ED PROVIDER NOTE - PHYSICAL EXAMINATION
CONSTITUTIONAL: Well-appearing; well-nourished; in no apparent distress.   EYES: PERRL; EOM intact.   NECK: Supple; non-tender; no cervical lymphadenopathy.  CARDIOVASCULAR: Normal S1, S2; no murmurs, rubs, or gallops.   RESPIRATORY: Normal chest excursion with respiration; breath sounds clear and equal bilaterally; no wheezes, rhonchi, or rales.  GI/: Normal bowel sounds; non-distended; + mild periumbilical ttp. No rebound or guarding. No CVA ttp. NO ttp at mcburneys point  MS: No evidence of trauma or deformity. Normal ROM in all four extremities; non-tender to palpation; distal pulses are normal.   SKIN: Normal for age and race; warm; dry; good turgor; no apparent lesions or exudate.   NEURO/PSYCH: A & O x 4; grossly unremarkable. mood and manner are appropriate. Grooming and personal hygiene are appropriate.

## 2021-02-24 NOTE — ED PROVIDER NOTE - CARE PROVIDER_API CALL
Francia Tubbs (MD)  Gastroenterology  [address] [place] [address]  Phone: [phone]  Fax: [phone]  Follow Up Time:

## 2021-02-24 NOTE — ED PROVIDER NOTE - CARE PROVIDERS DIRECT ADDRESSES
[email or web address]

## 2021-02-24 NOTE — ED PROVIDER NOTE - CARE PLAN
Principal Discharge DX:	Abdominal pain  Secondary Diagnosis:	Diarrhea  Secondary Diagnosis:	UTI (urinary tract infection)

## 2021-02-24 NOTE — ED PROVIDER NOTE - PATIENT PORTAL LINK FT
You can access the FollowMyHealth Patient Portal offered by [facility] by registering at the following website: [email or web address]. By joining [employer]’s FollowMyHealth portal, you will also be able to view your health information using other applications (apps) compatible with our system.

## 2021-02-24 NOTE — ED ADULT NURSE NOTE - NSIMPLEMENTINTERV_GEN_ALL_ED
Implemented All Universal Safety Interventions:  [place] to call system. Call bell, personal items and telephone within reach. Instruct patient to call for assistance. Room bathroom lighting operational. Non-slip footwear when patient is off stretcher. Physically safe environment: no spills, clutter or unnecessary equipment. Stretcher in lowest position, wheels locked, appropriate side rails in place.

## 2021-02-24 NOTE — ED PROVIDER NOTE - NS ED ROS FT
Constitutional: + fever. No chills, no recent weight loss, change in appetite or malaise  Eyes: no redness/discharge/pain/vision changes  ENT: no rhinorrhea/ear pain/sore throat  Cardiac: No chest pain, SOB or edema.  Respiratory: No cough or respiratory distress  GI: + n/d/lower abd pain. No vomiting  : No dysuria, frequency, urgency or hematuria  MS: no pain to back or extremities, no loss of ROM, no weakness  Neuro: No headache or weakness. No LOC.  Skin: No skin rash.  Endocrine: No history of thyroid disease or diabetes.  Except as documented in the HPI, all other systems are negative.

## 2021-02-24 NOTE — CHART NOTE - NSCHARTNOTEFT_GEN_A_CORE
SW spoke to pt in the ED.  Pt came to ED due to abdominal pain.   Pt's PCP Dr. Sade Swenson.  Pt currently has no home care needs.  SW provided pt with needed referrals in regards to pt's social issues.  SW provided pt with supportive counseling.  Case Management will remain available if needs present prior to d/c.

## 2021-02-24 NOTE — ED PROVIDER NOTE - ATTENDING CONTRIBUTION TO CARE
25 y/o female with h/o cholecystectomy, umbilical hernia repair, in ER with c/o abd pain and diarrhea. Pt states she's been having ~ 3-4 episodes of loose watery brown stool every day for the past 3 months, no blood in stool.  has abd pain/cramping prior to BM which then improves afterward, but states that abdominal pain has worsened for the past week.  +N.  no vomiting. no vaginal or urinary symptoms.  had temp to 100 2 days ago and had neg covid test, no fever since then.  no back pain.  no cp/sob.  no ha/dizziness/loc.  no recent travel or abx use.  PE - nad, nc/at, eomi, perrl, op - clear, mmm, cta b/l, no w/r/r, rrr, abd - soft, + mild mid abdominal tenderness, no guarding/rebound, nabs, from x 4, A&O x 3, no focal neuro deficits.  -ivf, check labs, ct abd, ua

## 2021-03-04 NOTE — ED ADULT NURSE NOTE - NSFALLRSKASSESASSIST_ED_ALL_ED
Patient is scheduled for a complete physical on 3-12-21.  Patient was advised to come in for fasting lab work 3-7 days prior to the physical.  Lab visit has been scheduled.  Please place lab order.              no

## 2021-06-15 ENCOUNTER — OUTPATIENT (OUTPATIENT)
Dept: OUTPATIENT SERVICES | Facility: HOSPITAL | Age: 26
LOS: 1 days | Discharge: HOME | End: 2021-06-15

## 2021-06-15 DIAGNOSIS — K08.409 PARTIAL LOSS OF TEETH, UNSPECIFIED CAUSE, UNSPECIFIED CLASS: Chronic | ICD-10-CM

## 2021-06-15 DIAGNOSIS — Z98.890 OTHER SPECIFIED POSTPROCEDURAL STATES: Chronic | ICD-10-CM

## 2022-04-05 NOTE — H&P PST ADULT - EYES
Patient returned call regarding below encounter, please call back   EOMI; PERRL; no drainage or redness

## 2022-08-11 NOTE — ASU PREOP CHECKLIST, PEDIATRIC - TEMP(CELSIUS)
98.4 Winlevi Pregnancy And Lactation Text: This medication is considered safe during pregnancy and breastfeeding.

## 2022-11-25 NOTE — ED PROVIDER NOTE - CHIEF COMPLAINT
The patient is a 24y Female complaining of see chief complaint quote. acute abdominal pain  CT findings  compatible with colitis of the descending and rectosigmoid colon, labs reporting leukocytosis, lactate was 1.5, prescribed Cipro and flagyl, offered admission but patient wanted to go home, O/P GI referral given, addendum in progress note regarding AB change that the patient requested on the following day after discharge

## 2023-04-12 NOTE — ED ADULT TRIAGE NOTE - TEMPERATURE IN CELSIUS (DEGREES C)
Procedure: EGD dx lezama's    Family Hx Colon Cancer: na    Previous Colon: na    Diagnosis Code(s): K22.70    BMI: 26    History of PHOEBE: no    Anes: MAC    Prep: npo    Diabetic: no    Blood Thinners: no    Patient taking phentermine: no    Patient taking Iron supplement: no    Patient taking cialis or viagra: na , if yes will need to hold 2 days.    Implants: no    Location to be completed:        36.9

## 2023-05-14 NOTE — ED PROVIDER NOTE - NSFOLLOWUPCLINICS_GEN_ALL_ED_FT
show [facility]  Infectious Disease  [address] [place] [id number]  Phone: [phone]  Fax:   Follow Up Time:

## 2023-09-14 NOTE — ED ADULT NURSE NOTE - CHIEF COMPLAINT QUOTE
body aches and one episode of vomiting Protopic Counseling: Patient may experience a mild burning sensation during topical application. Protopic is not approved in children less than 2 years of age. There have been case reports of hematologic and skin malignancies in patients using topical calcineurin inhibitors although causality is questionable.

## 2024-04-16 NOTE — ASU DISCHARGE PLAN (ADULT/PEDIATRIC) - NURSING INSTRUCTIONS
Imiquimod Pregnancy And Lactation Text: This medication is Pregnancy Category C. It is unknown if this medication is excreted in breast milk. Propranolol Pregnancy And Lactation Text: This medication is Pregnancy Category C and it isn't known if it is safe during pregnancy. It is excreted in breast milk. Topical Ketoconazole Pregnancy And Lactation Text: This medication is Pregnancy Category B and is considered safe during pregnancy. It is unknown if it is excreted in breast milk. Skyrizi Pregnancy And Lactation Text: The risk during pregnancy and breastfeeding is uncertain with this medication. Glycopyrrolate Counseling:  I discussed with the patient the risks of glycopyrrolate including but not limited to skin rash, drowsiness, dry mouth, difficulty urinating, and blurred vision. Drysol Counseling:  I discussed with the patient the risks of drysol/aluminum chloride including but not limited to skin rash, itching, irritation, burning. Fluconazole Counseling:  Patient counseled regarding adverse effects of fluconazole including but not limited to headache, diarrhea, nausea, upset stomach, liver function test abnormalities, taste disturbance, and stomach pain.  There is a rare possibility of liver failure that can occur when taking fluconazole.  The patient understands that monitoring of LFTs and kidney function test may be required, especially at baseline. The patient verbalized understanding of the proper use and possible adverse effects of fluconazole.  All of the patient's questions and concerns were addressed. Sotyktu Counseling:  I discussed the most common side effects of Sotyktu including: common cold, sore throat, sinus infections, cold sores, canker sores, folliculitis, and acne.  I also discussed more serious side effects of Sotyktu including but not limited to: serious allergic reactions; increased risk for infections such as TB; cancers such as lymphomas; rhabdomyolysis and elevated CPK; and elevated triglycerides and liver enzymes.  Clindamycin Counseling: I counseled the patient regarding use of clindamycin as an antibiotic for prophylactic and/or therapeutic purposes. Clindamycin is active against numerous classes of bacteria, including skin bacteria. Side effects may include nausea, diarrhea, gastrointestinal upset, rash, hives, yeast infections, and in rare cases, colitis. Soolantra Counseling: I discussed with the patients the risks of topial Soolantra. This is a medicine which decreases the number of mites and inflammation in the skin. You experience burning, stinging, eye irritation or allergic reactions.  Please call our office if you develop any problems from using this medication. VTAMA Counseling: I discussed with the patient that VTAMA is not for use in the eyes, mouth or mouth. They should call the office if they develop any signs of allergic reactions to VTAMA. The patient verbalized understanding of the proper use and possible adverse effects of VTAMA.  All of the patient's questions and concerns were addressed. Ilumya Counseling: I discussed with the patient the risks of tildrakizumab including but not limited to immunosuppression, malignancy, posterior leukoencephalopathy syndrome, and serious infections.  The patient understands that monitoring is required including a PPD at baseline and must alert us or the primary physician if symptoms of infection or other concerning signs are noted. Azathioprine Pregnancy And Lactation Text: This medication is Pregnancy Category D and isn't considered safe during pregnancy. It is unknown if this medication is excreted in breast milk. Solaraze Pregnancy And Lactation Text: This medication is Pregnancy Category B and is considered safe. There is some data to suggest avoiding during the third trimester. It is unknown if this medication is excreted in breast milk. Cellcept Counseling:  I discussed with the patient the risks of mycophenolate mofetil including but not limited to infection/immunosuppression, GI upset, hypokalemia, hypercholesterolemia, bone marrow suppression, lymphoproliferative disorders, malignancy, GI ulceration/bleed/perforation, colitis, interstitial lung disease, kidney failure, progressive multifocal leukoencephalopathy, and birth defects.  The patient understands that monitoring is required including a baseline creatinine and regular CBC testing. In addition, patient must alert us immediately if symptoms of infection or other concerning signs are noted. Vtama Pregnancy And Lactation Text: It is unknown if this medication can cause problems during pregnancy and breastfeeding. Hyrimoz Pregnancy And Lactation Text: This medication is Pregnancy Category B and is considered safe during pregnancy. It is unknown if this medication is excreted in breast milk. Olanzapine Counseling- I discussed with the patient the common side effects of olanzapine including but are not limited to: lack of energy, dry mouth, increased appetite, sleepiness, tremor, constipation, dizziness, changes in behavior, or restlessness.  Explained that teenagers are more likely to experience headaches, abdominal pain, pain in the arms or legs, tiredness, and sleepiness.  Serious side effects include but are not limited: increased risk of death in elderly patients who are confused, have memory loss, or dementia-related psychosis; hyperglycemia; increased cholesterol and triglycerides; and weight gain. Albendazole Pregnancy And Lactation Text: This medication is Pregnancy Category C and it isn't known if it is safe during pregnancy. It is also excreted in breast milk. Azelaic Acid Counseling: Patient counseled that medicine may cause skin irritation and to avoid applying near the eyes.  In the event of skin irritation, the patient was advised to reduce the amount of the drug applied or use it less frequently.   The patient verbalized understanding of the proper use and possible adverse effects of azelaic acid.  All of the patient's questions and concerns were addressed. Finasteride Male Counseling: Finasteride Counseling:  I discussed with the patient the risks of use of finasteride including but not limited to decreased libido, decreased ejaculate volume, gynecomastia, and depression. Women should not handle medication.  All of the patient's questions and concerns were addressed. Picato Counseling:  I discussed with the patient the risks of Picato including but not limited to erythema, scaling, itching, weeping, crusting, and pain. Cosentyx Counseling:  I discussed with the patient the risks of Cosentyx including but not limited to worsening of Crohn's disease, immunosuppression, allergic reactions and infections.  The patient understands that monitoring is required including a PPD at baseline and must alert us or the primary physician if symptoms of infection or other concerning signs are noted. Prednisone Pregnancy And Lactation Text: This medication is Pregnancy Category C and it isn't know if it is safe during pregnancy. This medication is excreted in breast milk. Use Enhanced Medication Counseling?: No Terbinafine Pregnancy And Lactation Text: This medication is Pregnancy Category B and is considered safe during pregnancy. It is also excreted in breast milk and breast feeding isn't recommended. DRINK PLENTY  OF FLUIDS Infliximab Counseling:  I discussed with the patient the risks of infliximab including but not limited to myelosuppression, immunosuppression, autoimmune hepatitis, demyelinating diseases, lymphoma, and serious infections.  The patient understands that monitoring is required including a PPD at baseline and must alert us or the primary physician if symptoms of infection or other concerning signs are noted. Arava Pregnancy And Lactation Text: This medication is Pregnancy Category X and is absolutely contraindicated during pregnancy. It is unknown if it is excreted in breast milk. Minocycline Pregnancy And Lactation Text: This medication is Pregnancy Category D and not consider safe during pregnancy. It is also excreted in breast milk. Erivedge Counseling- I discussed with the patient the risks of Erivedge including but not limited to nausea, vomiting, diarrhea, constipation, weight loss, changes in the sense of taste, decreased appetite, muscle spasms, and hair loss.  The patient verbalized understanding of the proper use and possible adverse effects of Erivedge.  All of the patient's questions and concerns were addressed. SSKI Counseling:  I discussed with the patient the risks of SSKI including but not limited to thyroid abnormalities, metallic taste, GI upset, fever, headache, acne, arthralgias, paraesthesias, lymphadenopathy, easy bleeding, arrhythmias, and allergic reaction. Fluconazole Pregnancy And Lactation Text: This medication is Pregnancy Category C and it isn't know if it is safe during pregnancy. It is also excreted in breast milk. Sotyktu Pregnancy And Lactation Text: There is insufficient data to evaluate whether or not Sotyktu is safe to use during pregnancy.   It is not known if Sotyktu passes into breast milk and whether or not it is safe to use when breastfeeding.   Cimetidine Counseling:  I discussed with the patient the risks of Cimetidine including but not limited to gynecomastia, headache, diarrhea, nausea, drowsiness, arrhythmias, pancreatitis, skin rashes, psychosis, bone marrow suppression and kidney toxicity. Clindamycin Pregnancy And Lactation Text: This medication can be used in pregnancy if certain situations. Clindamycin is also present in breast milk. Clofazimine Counseling:  I discussed with the patient the risks of clofazimine including but not limited to skin and eye pigmentation, liver damage, nausea/vomiting, gastrointestinal bleeding and allergy. Stelara Counseling:  I discussed with the patient the risks of ustekinumab including but not limited to immunosuppression, malignancy, posterior leukoencephalopathy syndrome, and serious infections.  The patient understands that monitoring is required including a PPD at baseline and must alert us or the primary physician if symptoms of infection or other concerning signs are noted. Soolantra Pregnancy And Lactation Text: This medication is Pregnancy Category C. This medication is considered safe during breast feeding. Drysol Pregnancy And Lactation Text: This medication is considered safe during pregnancy and breast feeding. Glycopyrrolate Pregnancy And Lactation Text: This medication is Pregnancy Category B and is considered safe during pregnancy. It is unknown if it is excreted breast milk. Benzoyl Peroxide Counseling: Patient counseled that medicine may cause skin irritation and bleach clothing.  In the event of skin irritation, the patient was advised to reduce the amount of the drug applied or use it less frequently.   The patient verbalized understanding of the proper use and possible adverse effects of benzoyl peroxide.  All of the patient's questions and concerns were addressed. Ivermectin Counseling:  Patient instructed to take medication on an empty stomach with a full glass of water.  Patient informed of potential adverse effects including but not limited to nausea, diarrhea, dizziness, itching, and swelling of the extremities or lymph nodes.  The patient verbalized understanding of the proper use and possible adverse effects of ivermectin.  All of the patient's questions and concerns were addressed. Cibinqo Counseling: I discussed with the patient the risks of Cibinqo therapy including but not limited to common cold, nausea, headache, cold sores, increased blood CPK levels, dizziness, UTIs, fatigue, acne, and vomitting. Live vaccines should be avoided.  This medication has been linked to serious infections; higher rate of mortality; malignancy and lymphoproliferative disorders; major adverse cardiovascular events; thrombosis; thrombocytopenia and lymphopenia; lipid elevations; and retinal detachment. Bexarotene Pregnancy And Lactation Text: This medication is Pregnancy Category X and should not be given to women who are pregnant or may become pregnant. This medication should not be used if you are breast feeding. Quinolones Counseling:  I discussed with the patient the risks of fluoroquinolones including but not limited to GI upset, allergic reaction, drug rash, diarrhea, dizziness, photosensitivity, yeast infections, liver function test abnormalities, tendonitis/tendon rupture. Topical Metronidazole Counseling: Metronidazole is a topical antibiotic medication. You may experience burning, stinging, redness, or allergic reactions.  Please call our office if you develop any problems from using this medication. Zoryve Counseling:  I discussed with the patient that Zoryve is not for use in the eyes, mouth or vagina. The most commonly reported side effects include diarrhea, headache, insomnia, application site pain, upper respiratory tract infections, and urinary tract infections.  All of the patient's questions and concerns were addressed. Olanzapine Pregnancy And Lactation Text: This medication is pregnancy category C.   There are no adequate and well controlled trials with olanzapine in pregnant females.  Olanzapine should be used during pregnancy only if the potential benefit justifies the potential risk to the fetus.   In a study in lactating healthy women, olanzapine was excreted in breast milk.  It is recommended that women taking olanzapine should not breast feed. Finasteride Female Counseling: Finasteride Counseling:  I discussed with the patient the risks of use of finasteride including but not limited to decreased libido and sexual dysfunction. Explained the teratogenic nature of the medication and stressed the importance of not getting pregnant during treatment. All of the patient's questions and concerns were addressed. Klisyri Counseling:  I discussed with the patient the risks of Klisyri including but not limited to erythema, scaling, itching, weeping, crusting, and pain. Topical Retinoid counseling:  Patient advised to apply a pea-sized amount only at bedtime and wait 30 minutes after washing their face before applying.  If too drying, patient may add a non-comedogenic moisturizer. The patient verbalized understanding of the proper use and possible adverse effects of retinoids.  All of the patient's questions and concerns were addressed. Oral Minoxidil Counseling- I discussed with the patient the risks of oral minoxidil including but not limited to shortness of breath, swelling of the feet or ankles, dizziness, lightheadedness, unwanted hair growth and allergic reaction.  The patient verbalized understanding of the proper use and possible adverse effects of oral minoxidil.  All of the patient's questions and concerns were addressed. Finasteride Pregnancy And Lactation Text: This medication is absolutely contraindicated during pregnancy. It is unknown if it is excreted in breast milk. Elidel Counseling: Patient may experience a mild burning sensation during topical application. Elidel is not approved in children less than 2 years of age. There have been case reports of hematologic and skin malignancies in patients using topical calcineurin inhibitors although causality is questionable. Clofazimine Pregnancy And Lactation Text: This medication is Pregnancy Category C and isn't considered safe during pregnancy. It is excreted in breast milk. Sski Pregnancy And Lactation Text: This medication is Pregnancy Category D and isn't considered safe during pregnancy. It is excreted in breast milk. Rituxan Counseling:  I discussed with the patient the risks of Rituxan infusions. Side effects can include infusion reactions, severe drug rashes including mucocutaneous reactions, reactivation of latent hepatitis and other infections and rarely progressive multifocal leukoencephalopathy.  All of the patient's questions and concerns were addressed. Benzoyl Peroxide Pregnancy And Lactation Text: This medication is Pregnancy Category C. It is unknown if benzoyl peroxide is excreted in breast milk. Libtayo Counseling- I discussed with the patient the risks of Libtayo including but not limited to nausea, vomiting, diarrhea, and bone or muscle pain.  The patient verbalized understanding of the proper use and possible adverse effects of Libtayo.  All of the patient's questions and concerns were addressed. Protopic Pregnancy And Lactation Text: This medication is Pregnancy Category C. It is unknown if this medication is excreted in breast milk when applied topically. Dupixent Pregnancy And Lactation Text: This medication likely crosses the placenta but the risk for the fetus is uncertain. This medication is excreted in breast milk. Hydroxychloroquine Counseling:  I discussed with the patient that a baseline ophthalmologic exam is needed at the start of therapy and every year thereafter while on therapy. A CBC may also be warranted for monitoring.  The side effects of this medication were discussed with the patient, including but not limited to agranulocytosis, aplastic anemia, seizures, rashes, retinopathy, and liver toxicity. Patient instructed to call the office should any adverse effect occur.  The patient verbalized understanding of the proper use and possible adverse effects of Plaquenil.  All the patient's questions and concerns were addressed. Protopic Counseling: Patient may experience a mild burning sensation during topical application. Protopic is not approved in children less than 2 years of age. There have been case reports of hematologic and skin malignancies in patients using topical calcineurin inhibitors although causality is questionable. Taltz Counseling: I discussed with the patient the risks of ixekizumab including but not limited to immunosuppression, serious infections, worsening of inflammatory bowel disease and drug reactions.  The patient understands that monitoring is required including a PPD at baseline and must alert us or the primary physician if symptoms of infection or other concerning signs are noted. Topical Metronidazole Pregnancy And Lactation Text: This medication is Pregnancy Category B and considered safe during pregnancy.  It is also considered safe to use while breastfeeding. Dupixent Counseling: I discussed with the patient the risks of dupilumab including but not limited to eye infection and irritation, cold sores, injection site reactions, worsening of asthma, allergic reactions and increased risk of parasitic infection.  Live vaccines should be avoided while taking dupilumab. Dupilumab will also interact with certain medications such as warfarin and cyclosporine. The patient understands that monitoring is required and they must alert us or the primary physician if symptoms of infection or other concerning signs are noted. Hydroxychloroquine Pregnancy And Lactation Text: This medication has been shown to cause fetal harm but it isn't assigned a Pregnancy Risk Category. There are small amounts excreted in breast milk. Isotretinoin Counseling: Patient should get monthly blood tests, not donate blood, not drive at night if vision affected, not share medication, and not undergo elective surgery for 6 months after tx completed. Side effects reviewed, pt to contact office should one occur. Cibinqo Pregnancy And Lactation Text: It is unknown if this medication will adversely affect pregnancy or breast feeding.  You should not take this medication if you are currently pregnant or planning a pregnancy or while breastfeeding. Klisyri Pregnancy And Lactation Text: It is unknown if this medication can harm a developing fetus or if it is excreted in breast milk. Cyclophosphamide Counseling:  I discussed with the patient the risks of cyclophosphamide including but not limited to hair loss, hormonal abnormalities, decreased fertility, abdominal pain, diarrhea, nausea and vomiting, bone marrow suppression and infection. The patient understands that monitoring is required while taking this medication. Griseofulvin Counseling:  I discussed with the patient the risks of griseofulvin including but not limited to photosensitivity, cytopenia, liver damage, nausea/vomiting and severe allergy.  The patient understands that this medication is best absorbed when taken with a fatty meal (e.g., ice cream or french fries). Xeljanz Counseling: I discussed with the patient the risks of Xeljanz therapy including increased risk of infection, liver issues, headache, diarrhea, or cold symptoms. Live vaccines should be avoided. They were instructed to call if they have any problems. Doxycycline Counseling:  Patient counseled regarding possible photosensitivity and increased risk for sunburn.  Patient instructed to avoid sunlight, if possible.  When exposed to sunlight, patients should wear protective clothing, sunglasses, and sunscreen.  The patient was instructed to call the office immediately if the following severe adverse effects occur:  hearing changes, easy bruising/bleeding, severe headache, or vision changes.  The patient verbalized understanding of the proper use and possible adverse effects of doxycycline.  All of the patient's questions and concerns were addressed. Carac Counseling:  I discussed with the patient the risks of Carac including but not limited to erythema, scaling, itching, weeping, crusting, and pain. Litfulo Counseling: I discussed with the patient the risks of Litfulo therapy including but not limited to upper respiratory tract infections, shingles, cold sores, and nausea. Live vaccines should be avoided.  This medication has been linked to serious infections; higher rate of mortality; malignancy and lymphoproliferative disorders; major adverse cardiovascular events; thrombosis; gastrointestinal perforations; neutropenia; lymphopenia; anemia; liver enzyme elevations; and lipid elevations. Azithromycin Counseling:  I discussed with the patient the risks of azithromycin including but not limited to GI upset, allergic reaction, drug rash, diarrhea, and yeast infections. Zyclara Counseling:  I discussed with the patient the risks of imiquimod including but not limited to erythema, scaling, itching, weeping, crusting, and pain.  Patient understands that the inflammatory response to imiquimod is variable from person to person and was educated regarded proper titration schedule.  If flu-like symptoms develop, patient knows to discontinue the medication and contact us. Rituxan Pregnancy And Lactation Text: This medication is Pregnancy Category C and it isn't know if it is safe during pregnancy. It is unknown if this medication is excreted in breast milk but similar antibodies are known to be excreted. Qbrexza Counseling:  I discussed with the patient the risks of Qbrexza including but not limited to headache, mydriasis, blurred vision, dry eyes, nasal dryness, dry mouth, dry throat, dry skin, urinary hesitation, and constipation.  Local skin reactions including erythema, burning, stinging, and itching can also occur. Birth Control Pills Counseling: Birth Control Pill Counseling: I discussed with the patient the potential side effects of OCPs including but not limited to increased risk of stroke, heart attack, thrombophlebitis, deep venous thrombosis, hepatic adenomas, breast changes, GI upset, headaches, and depression.  The patient verbalized understanding of the proper use and possible adverse effects of OCPs. All of the patient's questions and concerns were addressed. Enbrel Counseling:  I discussed with the patient the risks of etanercept including but not limited to myelosuppression, immunosuppression, autoimmune hepatitis, demyelinating diseases, lymphoma, and infections.  The patient understands that monitoring is required including a PPD at baseline and must alert us or the primary physician if symptoms of infection or other concerning signs are noted. Libtayo Pregnancy And Lactation Text: This medication is contraindicated in pregnancy and when breast feeding. Minoxidil Pregnancy And Lactation Text: This medication has not been assigned a Pregnancy Risk Category but animal studies failed to show danger with the topical medication. It is unknown if the medication is excreted in breast milk. Colchicine Counseling:  Patient counseled regarding adverse effects including but not limited to stomach upset (nausea, vomiting, stomach pain, or diarrhea).  Patient instructed to limit alcohol consumption while taking this medication.  Colchicine may reduce blood counts especially with prolonged use.  The patient understands that monitoring of kidney function and blood counts may be required, especially at baseline. The patient verbalized understanding of the proper use and possible adverse effects of colchicine.  All of the patient's questions and concerns were addressed. Thalidomide Counseling: I discussed with the patient the risks of thalidomide including but not limited to birth defects, anxiety, weakness, chest pain, dizziness, cough and severe allergy. High Dose Vitamin A Counseling: Side effects reviewed, pt to contact office should one occur. Adbry Pregnancy And Lactation Text: It is unknown if this medication will adversely affect pregnancy or breast feeding. Rifampin Pregnancy And Lactation Text: This medication is Pregnancy Category C and it isn't know if it is safe during pregnancy. It is also excreted in breast milk and should not be used if you are breast feeding. Minoxidil Counseling: Minoxidil is a topical medication which can increase blood flow where it is applied. It is uncertain how this medication increases hair growth. Side effects are uncommon and include stinging and allergic reactions. Topical Steroids Counseling: I discussed with the patient that prolonged use of topical steroids can result in the increased appearance of superficial blood vessels (telangiectasias), lightening (hypopigmentation) and thinning of the skin (atrophy).  Patient understands to avoid using high potency steroids in skin folds, the groin or the face.  The patient verbalized understanding of the proper use and possible adverse effects of topical steroids.  All of the patient's questions and concerns were addressed. Isotretinoin Pregnancy And Lactation Text: This medication is Pregnancy Category X and is considered extremely dangerous during pregnancy. It is unknown if it is excreted in breast milk. Adbry Counseling: I discussed with the patient the risks of tralokinumab including but not limited to eye infection and irritation, cold sores, injection site reactions, worsening of asthma, allergic reactions and increased risk of parasitic infection.  Live vaccines should be avoided while taking tralokinumab. The patient understands that monitoring is required and they must alert us or the primary physician if symptoms of infection or other concerning signs are noted. Rifampin Counseling: I discussed with the patient the risks of rifampin including but not limited to liver damage, kidney damage, red-orange body fluids, nausea/vomiting and severe allergy. Griseofulvin Pregnancy And Lactation Text: This medication is Pregnancy Category X and is known to cause serious birth defects. It is unknown if this medication is excreted in breast milk but breast feeding should be avoided. Doxycycline Pregnancy And Lactation Text: This medication is Pregnancy Category D and not consider safe during pregnancy. It is also excreted in breast milk but is considered safe for shorter treatment courses. Low Dose Naltrexone Counseling- I discussed with the patient the potential risks and side effects of low dose naltrexone including but not limited to: more vivid dreams, headaches, nausea, vomiting, abdominal pain, fatigue, dizziness, and anxiety. Xelbryantz Pregnancy And Lactation Text: This medication is Pregnancy Category D and is not considered safe during pregnancy.  The risk during breast feeding is also uncertain. Doxepin Counseling:  Patient advised that the medication is sedating and not to drive a car after taking this medication. Patient informed of potential adverse effects including but not limited to dry mouth, urinary retention, and blurry vision.  The patient verbalized understanding of the proper use and possible adverse effects of doxepin.  All of the patient's questions and concerns were addressed. Cyclophosphamide Pregnancy And Lactation Text: This medication is Pregnancy Category D and it isn't considered safe during pregnancy. This medication is excreted in breast milk. Oral Minoxidil Pregnancy And Lactation Text: This medication should only be used when clearly needed if you are pregnant, attempting to become pregnant or breast feeding. Qbrexza Pregnancy And Lactation Text: There is no available data on Qbrexza use in pregnant women.  There is no available data on Qbrexza use in lactation. Low Dose Naltrexone Pregnancy And Lactation Text: Naltrexone is pregnancy category C.  There have been no adequate and well-controlled studies in pregnant women.  It should be used in pregnancy only if the potential benefit justifies the potential risk to the fetus.   Limited data indicates that naltrexone is minimally excreted into breastmilk. Cyclosporine Counseling:  I discussed with the patient the risks of cyclosporine including but not limited to hypertension, gingival hyperplasia,myelosuppression, immunosuppression, liver damage, kidney damage, neurotoxicity, lymphoma, and serious infections. The patient understands that monitoring is required including baseline blood pressure, CBC, CMP, lipid panel and uric acid, and then 1-2 times monthly CMP and blood pressure. Acitretin Counseling:  I discussed with the patient the risks of acitretin including but not limited to hair loss, dry lips/skin/eyes, liver damage, hyperlipidemia, depression/suicidal ideation, photosensitivity.  Serious rare side effects can include but are not limited to pancreatitis, pseudotumor cerebri, bony changes, clot formation/stroke/heart attack.  Patient understands that alcohol is contraindicated since it can result in liver toxicity and significantly prolong the elimination of the drug by many years. Otezla Counseling: The side effects of Otezla were discussed with the patient, including but not limited to worsening or new depression, weight loss, diarrhea, nausea, upper respiratory tract infection, and headache. Patient instructed to call the office should any adverse effect occur.  The patient verbalized understanding of the proper use and possible adverse effects of Otezla.  All the patient's questions and concerns were addressed. High Dose Vitamin A Pregnancy And Lactation Text: High dose vitamin A therapy is contraindicated during pregnancy and breast feeding. Sarecycline Counseling: Patient advised regarding possible photosensitivity and discoloration of the teeth, skin, lips, tongue and gums.  Patient instructed to avoid sunlight, if possible.  When exposed to sunlight, patients should wear protective clothing, sunglasses, and sunscreen.  The patient was instructed to call the office immediately if the following severe adverse effects occur:  hearing changes, easy bruising/bleeding, severe headache, or vision changes.  The patient verbalized understanding of the proper use and possible adverse effects of sarecycline.  All of the patient's questions and concerns were addressed. Topical Sulfur Applications Counseling: Topical Sulfur Counseling: Patient counseled that this medication may cause skin irritation or allergic reactions.  In the event of skin irritation, the patient was advised to reduce the amount of the drug applied or use it less frequently.   The patient verbalized understanding of the proper use and possible adverse effects of topical sulfur application.  All of the patient's questions and concerns were addressed. Siliq Counseling:  I discussed with the patient the risks of Siliq including but not limited to new or worsening depression, suicidal thoughts and behavior, immunosuppression, malignancy, posterior leukoencephalopathy syndrome, and serious infections.  The patient understands that monitoring is required including a PPD at baseline and must alert us or the primary physician if symptoms of infection or other concerning signs are noted. There is also a special program designed to monitor depression which is required with Siliq. Birth Control Pills Pregnancy And Lactation Text: This medication should be avoided if pregnant and for the first 30 days post-partum. Mirvaso Counseling: Mirvaso is a topical medication which can decrease superficial blood flow where applied. Side effects are uncommon and include stinging, redness and allergic reactions. Bimzelx Counseling:  I discussed with the patient the risks of Bimzelx including but not limited to depression, immunosuppression, allergic reactions and infections.  The patient understands that monitoring is required including a PPD at baseline and must alert us or the primary physician if symptoms of infection or other concerning signs are noted. Odomzo Counseling- I discussed with the patient the risks of Odomzo including but not limited to nausea, vomiting, diarrhea, constipation, weight loss, changes in the sense of taste, decreased appetite, muscle spasms, and hair loss.  The patient verbalized understanding of the proper use and possible adverse effects of Odomzo.  All of the patient's questions and concerns were addressed. Itraconazole Counseling:  I discussed with the patient the risks of itraconazole including but not limited to liver damage, nausea/vomiting, neuropathy, and severe allergy.  The patient understands that this medication is best absorbed when taken with acidic beverages such as non-diet cola or ginger ale.  The patient understands that monitoring is required including baseline LFTs and repeat LFTs at intervals.  The patient understands that they are to contact us or the primary physician if concerning signs are noted. Topical Steroids Applications Pregnancy And Lactation Text: Most topical steroids are considered safe to use during pregnancy and lactation.  Any topical steroid applied to the breast or nipple should be washed off before breastfeeding. Spironolactone Counseling: Patient advised regarding risks of diarrhea, abdominal pain, hyperkalemia, birth defects (for female patients), liver toxicity and renal toxicity. The patient may need blood work to monitor liver and kidney function and potassium levels while on therapy. The patient verbalized understanding of the proper use and possible adverse effects of spironolactone.  All of the patient's questions and concerns were addressed. Erythromycin Counseling:  I discussed with the patient the risks of erythromycin including but not limited to GI upset, allergic reaction, drug rash, diarrhea, increase in liver enzymes, and yeast infections. Dapsone Counseling: I discussed with the patient the risks of dapsone including but not limited to hemolytic anemia, agranulocytosis, rashes, methemoglobinemia, kidney failure, peripheral neuropathy, headaches, GI upset, and liver toxicity.  Patients who start dapsone require monitoring including baseline LFTs and weekly CBCs for the first month, then every month thereafter.  The patient verbalized understanding of the proper use and possible adverse effects of dapsone.  All of the patient's questions and concerns were addressed. Tranexamic Acid Counseling:  Patient advised of the small risk of bleeding problems with tranexamic acid. They were also instructed to call if they developed any nausea, vomiting or diarrhea. All of the patient's questions and concerns were addressed. Tazorac Counseling:  Patient advised that medication is irritating and drying.  Patient may need to apply sparingly and wash off after an hour before eventually leaving it on overnight.  The patient verbalized understanding of the proper use and possible adverse effects of tazorac.  All of the patient's questions and concerns were addressed. Eucrisa Counseling: Patient may experience a mild burning sensation during topical application. Eucrisa is not approved in children less than 2 years of age. Doxepin Pregnancy And Lactation Text: This medication is Pregnancy Category C and it isn't known if it is safe during pregnancy. It is also excreted in breast milk and breast feeding isn't recommended. Tremfya Counseling: I discussed with the patient the risks of guselkumab including but not limited to immunosuppression, serious infections, and drug reactions.  The patient understands that monitoring is required including a PPD at baseline and must alert us or the primary physician if symptoms of infection or other concerning signs are noted. Carac Pregnancy And Lactation Text: This medication is Pregnancy Category X and contraindicated in pregnancy and in women who may become pregnant. It is unknown if this medication is excreted in breast milk. Litfulo Pregnancy And Lactation Text: Based on animal studies, Lifulo may cause embryo-fetal harm when administered to pregnant women.  The medication should not be used in pregnancy.  Breastfeeding is not recommended during treatment. Azithromycin Pregnancy And Lactation Text: This medication is considered safe during pregnancy and is also secreted in breast milk. Mirvaso Pregnancy And Lactation Text: This medication has not been assigned a Pregnancy Risk Category. It is unknown if the medication is excreted in breast milk. Topical Sulfur Applications Pregnancy And Lactation Text: This medication is considered safe during pregnancy and breast feeding secondary to limited systemic absorption. Bimzelx Pregnancy And Lactation Text: This medication crosses the placenta and the safety is uncertain during pregnancy. It is unknown if this medication is present in breast milk. Calcipotriene Pregnancy And Lactation Text: The use of this medication during pregnancy or lactation is not recommended as there is insufficient data. Otezla Pregnancy And Lactation Text: This medication is Pregnancy Category C and it isn't known if it is safe during pregnancy. It is unknown if it is excreted in breast milk. Hydroquinone Counseling:  Patient advised that medication may result in skin irritation, lightening (hypopigmentation), dryness, and burning.  In the event of skin irritation, the patient was advised to reduce the amount of the drug applied or use it less frequently.  Rarely, spots that are treated with hydroquinone can become darker (pseudoochronosis).  Should this occur, patient instructed to stop medication and call the office. The patient verbalized understanding of the proper use and possible adverse effects of hydroquinone.  All of the patient's questions and concerns were addressed. Ketoconazole Counseling:   Patient counseled regarding improving absorption with orange juice.  Adverse effects include but are not limited to breast enlargement, headache, diarrhea, nausea, upset stomach, liver function test abnormalities, taste disturbance, and stomach pain.  There is a rare possibility of liver failure that can occur when taking ketoconazole. The patient understands that monitoring of LFTs may be required, especially at baseline. The patient verbalized understanding of the proper use and possible adverse effects of ketoconazole.  All of the patient's questions and concerns were addressed. Metronidazole Counseling:  I discussed with the patient the risks of metronidazole including but not limited to seizures, nausea/vomiting, a metallic taste in the mouth, nausea/vomiting and severe allergy. Hydroxyzine Pregnancy And Lactation Text: This medication is not safe during pregnancy and should not be taken. It is also excreted in breast milk and breast feeding isn't recommended. Oxybutynin Counseling:  I discussed with the patient the risks of oxybutynin including but not limited to skin rash, drowsiness, dry mouth, difficulty urinating, and blurred vision. Tazorac Pregnancy And Lactation Text: This medication is not safe during pregnancy. It is unknown if this medication is excreted in breast milk. Spironolactone Pregnancy And Lactation Text: This medication can cause feminization of the male fetus and should be avoided during pregnancy. The active metabolite is also found in breast milk. Hydroxyzine Counseling: Patient advised that the medication is sedating and not to drive a car after taking this medication.  Patient informed of potential adverse effects including but not limited to dry mouth, urinary retention, and blurry vision.  The patient verbalized understanding of the proper use and possible adverse effects of hydroxyzine.  All of the patient's questions and concerns were addressed. Erythromycin Pregnancy And Lactation Text: This medication is Pregnancy Category B and is considered safe during pregnancy. It is also excreted in breast milk. Simponi Counseling:  I discussed with the patient the risks of golimumab including but not limited to myelosuppression, immunosuppression, autoimmune hepatitis, demyelinating diseases, lymphoma, and serious infections.  The patient understands that monitoring is required including a PPD at baseline and must alert us or the primary physician if symptoms of infection or other concerning signs are noted. Dapsone Pregnancy And Lactation Text: This medication is Pregnancy Category C and is not considered safe during pregnancy or breast feeding. Bactrim Counseling:  I discussed with the patient the risks of sulfa antibiotics including but not limited to GI upset, allergic reaction, drug rash, diarrhea, dizziness, photosensitivity, and yeast infections.  Rarely, more serious reactions can occur including but not limited to aplastic anemia, agranulocytosis, methemoglobinemia, blood dyscrasias, liver or kidney failure, lung infiltrates or desquamative/blistering drug rashes. Rhofade Counseling: Rhofade is a topical medication which can decrease superficial blood flow where applied. Side effects are uncommon and include stinging, redness and allergic reactions. Tranexamic Acid Pregnancy And Lactation Text: It is unknown if this medication is safe during pregnancy or breast feeding. Calcipotriene Counseling:  I discussed with the patient the risks of calcipotriene including but not limited to erythema, scaling, itching, and irritation. Opioid Counseling: I discussed with the patient the potential side effects of opioids including but not limited to addiction, altered mental status, and depression. I stressed avoiding alcohol, benzodiazepines, muscle relaxants and sleep aids unless specifically okayed by a physician. The patient verbalized understanding of the proper use and possible adverse effects of opioids. All of the patient's questions and concerns were addressed. They were instructed to flush the remaining pills down the toilet if they did not need them for pain. Olumiant Counseling: I discussed with the patient the risks of Olumiant therapy including but not limited to upper respiratory tract infections, shingles, cold sores, and nausea. Live vaccines should be avoided.  This medication has been linked to serious infections; higher rate of mortality; malignancy and lymphoproliferative disorders; major adverse cardiovascular events; thrombosis; gastrointestinal perforations; neutropenia; lymphopenia; anemia; liver enzyme elevations; and lipid elevations. Acitretin Pregnancy And Lactation Text: This medication is Pregnancy Category X and should not be given to women who are pregnant or may become pregnant in the future. This medication is excreted in breast milk. Humira Counseling:  I discussed with the patient the risks of adalimumab including but not limited to myelosuppression, immunosuppression, autoimmune hepatitis, demyelinating diseases, lymphoma, and serious infections.  The patient understands that monitoring is required including a PPD at baseline and must alert us or the primary physician if symptoms of infection or other concerning signs are noted. Niacinamide Counseling: I recommended taking niacin or niacinamide, also know as vitamin B3, twice daily. Recent evidence suggests that taking vitamin B3 (500 mg twice daily) can reduce the risk of actinic keratoses and non-melanoma skin cancers. Side effects of vitamin B3 include flushing and headache. Opzelura Counseling:  I discussed with the patient the risks of Opzelura including but not limited to nasopharngitis, bronchitis, ear infection, eosinophila, hives, diarrhea, folliculitis, tonsillitis, and rhinorrhea.  Taken orally, this medication has been linked to serious infections; higher rate of mortality; malignancy and lymphoproliferative disorders; major adverse cardiovascular events; thrombosis; thrombocytopenia, anemia, and neutropenia; and lipid elevations. Gabapentin Counseling: I discussed with the patient the risks of gabapentin including but not limited to dizziness, somnolence, fatigue and ataxia. Xolair Counseling:  Patient informed of potential adverse effects including but not limited to fever, muscle aches, rash and allergic reactions.  The patient verbalized understanding of the proper use and possible adverse effects of Xolair.  All of the patient's questions and concerns were addressed. Wartpeel Counseling:  I discussed with the patient the risks of Wartpeel including but not limited to erythema, scaling, itching, weeping, crusting, and pain. Opioid Pregnancy And Lactation Text: These medications can lead to premature delivery and should be avoided during pregnancy. These medications are also present in breast milk in small amounts. Cantharidin Pregnancy And Lactation Text: This medication has not been proven safe during pregnancy. It is unknown if this medication is excreted in breast milk. Niacinamide Pregnancy And Lactation Text: These medications are considered safe during pregnancy. Cimzia Counseling:  I discussed with the patient the risks of Cimzia including but not limited to immunosuppression, allergic reactions and infections.  The patient understands that monitoring is required including a PPD at baseline and must alert us or the primary physician if symptoms of infection or other concerning signs are noted. Ketoconazole Pregnancy And Lactation Text: This medication is Pregnancy Category C and it isn't know if it is safe during pregnancy. It is also excreted in breast milk and breast feeding isn't recommended. Metronidazole Pregnancy And Lactation Text: This medication is Pregnancy Category B and considered safe during pregnancy.  It is also excreted in breast milk. Winlevi Counseling:  I discussed with the patient the risks of topical clascoterone including but not limited to erythema, scaling, itching, and stinging. Patient voiced their understanding. Dutasteride Male Counseling: Dustasteride Counseling:  I discussed with the patient the risks of use of dutasteride including but not limited to decreased libido, decreased ejaculate volume, and gynecomastia. Women who can become pregnant should not handle medication.  All of the patient's questions and concerns were addressed. Cephalexin Counseling: I counseled the patient regarding use of cephalexin as an antibiotic for prophylactic and/or therapeutic purposes. Cephalexin (commonly prescribed under brand name Keflex) is a cephalosporin antibiotic which is active against numerous classes of bacteria, including most skin bacteria. Side effects may include nausea, diarrhea, gastrointestinal upset, rash, hives, yeast infections, and in rare cases, hepatitis, kidney disease, seizures, fever, confusion, neurologic symptoms, and others. Patients with severe allergies to penicillin medications are cautioned that there is about a 10% incidence of cross-reactivity with cephalosporins. When possible, patients with penicillin allergies should use alternatives to cephalosporins for antibiotic therapy. Methotrexate Counseling:  Patient counseled regarding adverse effects of methotrexate including but not limited to nausea, vomiting, abnormalities in liver function tests. Patients may develop mouth sores, rash, diarrhea, and abnormalities in blood counts. The patient understands that monitoring is required including LFT's and blood counts.  There is a rare possibility of scarring of the liver and lung problems that can occur when taking methotrexate. Persistent nausea, loss of appetite, pale stools, dark urine, cough, and shortness of breath should be reported immediately. Patient advised to discontinue methotrexate treatment at least three months before attempting to become pregnant.  I discussed the need for folate supplements while taking methotrexate.  These supplements can decrease side effects during methotrexate treatment. The patient verbalized understanding of the proper use and possible adverse effects of methotrexate.  All of the patient's questions and concerns were addressed. Topical Clindamycin Counseling: Patient counseled that this medication may cause skin irritation or allergic reactions.  In the event of skin irritation, the patient was advised to reduce the amount of the drug applied or use it less frequently.   The patient verbalized understanding of the proper use and possible adverse effects of clindamycin.  All of the patient's questions and concerns were addressed. Detail Level: Simple Dutasteride Female Counseling: Dutasteride Counseling:  I discussed with the patient the risks of use of dutasteride including but not limited to decreased libido and sexual dysfunction. Explained the teratogenic nature of the medication and stressed the importance of not getting pregnant during treatment. All of the patient's questions and concerns were addressed. Bactrim Pregnancy And Lactation Text: This medication is Pregnancy Category D and is known to cause fetal risk.  It is also excreted in breast milk. Methotrexate Pregnancy And Lactation Text: This medication is Pregnancy Category X and is known to cause fetal harm. This medication is excreted in breast milk. Cantharidin Counseling:  I discussed with the patient the risks of Cantharidin including but not limited to pain, redness, burning, itching, and blistering. Olumiant Pregnancy And Lactation Text: Based on animal studies, Olumiant may cause embryo-fetal harm when administered to pregnant women.  The medication should not be used in pregnancy.  Breastfeeding is not recommended during treatment. Bexarotene Counseling:  I discussed with the patient the risks of bexarotene including but not limited to hair loss, dry lips/skin/eyes, liver abnormalities, hyperlipidemia, pancreatitis, depression/suicidal ideation, photosensitivity, drug rash/allergic reactions, hypothyroidism, anemia, leukopenia, infection, cataracts, and teratogenicity.  Patient understands that they will need regular blood tests to check lipid profile, liver function tests, white blood cell count, thyroid function tests and pregnancy test if applicable. Valtrex Counseling: I discussed with the patient the risks of valacyclovir including but not limited to kidney damage, nausea, vomiting and severe allergy.  The patient understands that if the infection seems to be worsening or is not improving, they are to call. Aklief counseling:  Patient advised to apply a pea-sized amount only at bedtime and wait 30 minutes after washing their face before applying.  If too drying, patient may add a non-comedogenic moisturizer.  The most commonly reported side effects including irritation, redness, scaling, dryness, stinging, burning, itching, and increased risk of sunburn.  The patient verbalized understanding of the proper use and possible adverse effects of retinoids.  All of the patient's questions and concerns were addressed. Tetracycline Counseling: Patient counseled regarding possible photosensitivity and increased risk for sunburn.  Patient instructed to avoid sunlight, if possible.  When exposed to sunlight, patients should wear protective clothing, sunglasses, and sunscreen.  The patient was instructed to call the office immediately if the following severe adverse effects occur:  hearing changes, easy bruising/bleeding, severe headache, or vision changes.  The patient verbalized understanding of the proper use and possible adverse effects of tetracycline.  All of the patient's questions and concerns were addressed. Patient understands to avoid pregnancy while on therapy due to potential birth defects. Terbinafine Counseling: Patient counseling regarding adverse effects of terbinafine including but not limited to headache, diarrhea, rash, upset stomach, liver function test abnormalities, itching, taste/smell disturbance, nausea, abdominal pain, and flatulence.  There is a rare possibility of liver failure that can occur when taking terbinafine.  The patient understands that a baseline LFT and kidney function test may be required. The patient verbalized understanding of the proper use and possible adverse effects of terbinafine.  All of the patient's questions and concerns were addressed. Skyrizi Counseling: I discussed with the patient the risks of risankizumab-rzaa including but not limited to immunosuppression, and serious infections.  The patient understands that monitoring is required including a PPD at baseline and must alert us or the primary physician if symptoms of infection or other concerning signs are noted. Minocycline Counseling: Patient advised regarding possible photosensitivity and discoloration of the teeth, skin, lips, tongue and gums.  Patient instructed to avoid sunlight, if possible.  When exposed to sunlight, patients should wear protective clothing, sunglasses, and sunscreen.  The patient was instructed to call the office immediately if the following severe adverse effects occur:  hearing changes, easy bruising/bleeding, severe headache, or vision changes.  The patient verbalized understanding of the proper use and possible adverse effects of minocycline.  All of the patient's questions and concerns were addressed. Imiquimod Counseling:  I discussed with the patient the risks of imiquimod including but not limited to erythema, scaling, itching, weeping, crusting, and pain.  Patient understands that the inflammatory response to imiquimod is variable from person to person and was educated regarded proper titration schedule.  If flu-like symptoms develop, patient knows to discontinue the medication and contact us. Xolair Pregnancy And Lactation Text: This medication is Pregnancy Category B and is considered safe during pregnancy. This medication is excreted in breast milk. Topical Ketoconazole Counseling: Patient counseled that this medication may cause skin irritation or allergic reactions.  In the event of skin irritation, the patient was advised to reduce the amount of the drug applied or use it less frequently.   The patient verbalized understanding of the proper use and possible adverse effects of ketoconazole.  All of the patient's questions and concerns were addressed. Winlevi Pregnancy And Lactation Text: This medication is considered safe during pregnancy and breastfeeding. Azathioprine Counseling:  I discussed with the patient the risks of azathioprine including but not limited to myelosuppression, immunosuppression, hepatotoxicity, lymphoma, and infections.  The patient understands that monitoring is required including baseline LFTs, Creatinine, possible TPMP genotyping and weekly CBCs for the first month and then every 2 weeks thereafter.  The patient verbalized understanding of the proper use and possible adverse effects of azathioprine.  All of the patient's questions and concerns were addressed. Nsaids Counseling: NSAID Counseling: I discussed with the patient that NSAIDs should be taken with food. Prolonged use of NSAIDs can result in the development of stomach ulcers.  Patient advised to stop taking NSAIDs if abdominal pain occurs.  The patient verbalized understanding of the proper use and possible adverse effects of NSAIDs.  All of the patient's questions and concerns were addressed. Rinvoq Pregnancy And Lactation Text: Based on animal studies, Rinvoq may cause embryo-fetal harm when administered to pregnant women.  The medication should not be used in pregnancy.  Breastfeeding is not recommended during treatment and for 6 days after the last dose. Cephalexin Pregnancy And Lactation Text: This medication is Pregnancy Category B and considered safe during pregnancy.  It is also excreted in breast milk but can be used safely for shorter doses. Nsaids Pregnancy And Lactation Text: These medications are considered safe up to 30 weeks gestation. It is excreted in breast milk. Dutasteride Pregnancy And Lactation Text: This medication is absolutely contraindicated in women, especially during pregnancy and breast feeding. Feminization of male fetuses is possible if taking while pregnant. 5-Fu Counseling: 5-Fluorouracil Counseling:  I discussed with the patient the risks of 5-fluorouracil including but not limited to erythema, scaling, itching, weeping, crusting, and pain. Solaraze Counseling:  I discussed with the patient the risks of Solaraze including but not limited to erythema, scaling, itching, weeping, crusting, and pain. Hyrimoz Counseling:  I discussed with the patient the risks of adalimumab including but not limited to myelosuppression, immunosuppression, autoimmune hepatitis, demyelinating diseases, lymphoma, and serious infections.  The patient understands that monitoring is required including a PPD at baseline and must alert us or the primary physician if symptoms of infection or other concerning signs are noted. Rinvoq Counseling: I discussed with the patient the risks of Rinvoq therapy including but not limited to upper respiratory tract infections, shingles, cold sores, bronchitis, nausea, cough, fever, acne, and headache. Live vaccines should be avoided.  This medication has been linked to serious infections; higher rate of mortality; malignancy and lymphoproliferative disorders; major adverse cardiovascular events; thrombosis; thrombocytopenia, anemia, and neutropenia; lipid elevations; liver enzyme elevations; and gastrointestinal perforations. Albendazole Counseling:  I discussed with the patient the risks of albendazole including but not limited to cytopenia, kidney damage, nausea/vomiting and severe allergy.  The patient understands that this medication is being used in an off-label manner. Arava Counseling:  Patient counseled regarding adverse effects of Arava including but not limited to nausea, vomiting, abnormalities in liver function tests. Patients may develop mouth sores, rash, diarrhea, and abnormalities in blood counts. The patient understands that monitoring is required including LFTs and blood counts.  There is a rare possibility of scarring of the liver and lung problems that can occur when taking methotrexate. Persistent nausea, loss of appetite, pale stools, dark urine, cough, and shortness of breath should be reported immediately. Patient advised to discontinue Arava treatment and consult with a physician prior to attempting conception. The patient will have to undergo a treatment to eliminate Arava from the body prior to conception. Prednisone Counseling:  I discussed with the patient the risks of prolonged use of prednisone including but not limited to weight gain, insomnia, osteoporosis, mood changes, diabetes, susceptibility to infection, glaucoma and high blood pressure.  In cases where prednisone use is prolonged, patients should be monitored with blood pressure checks, serum glucose levels and an eye exam.  Additionally, the patient may need to be placed on GI prophylaxis, PCP prophylaxis, and calcium and vitamin D supplementation and/or a bisphosphonate.  The patient verbalized understanding of the proper use and the possible adverse effects of prednisone.  All of the patient's questions and concerns were addressed. Propranolol Counseling:  I discussed with the patient the risks of propranolol including but not limited to low heart rate, low blood pressure, low blood sugar, restlessness and increased cold sensitivity. They should call the office if they experience any of these side effects. Opzelura Pregnancy And Lactation Text: There is insufficient data to evaluate drug-associated risk for major birth defects, miscarriage, or other adverse maternal or fetal outcomes.  There is a pregnancy registry that monitors pregnancy outcomes in pregnant persons exposed to the medication during pregnancy.  It is unknown if this medication is excreted in breast milk.  Do not breastfeed during treatment and for about 4 weeks after the last dose. Aklief Pregnancy And Lactation Text: It is unknown if this medication is safe to use during pregnancy.  It is unknown if this medication is excreted in breast milk.  Breastfeeding women should use the topical cream on the smallest area of the skin for the shortest time needed while breastfeeding.  Do not apply to nipple and areola. Cimzia Pregnancy And Lactation Text: This medication crosses the placenta but can be considered safe in certain situations. Cimzia may be excreted in breast milk. Valtrex Pregnancy And Lactation Text: this medication is Pregnancy Category B and is considered safe during pregnancy. This medication is not directly found in breast milk but it's metabolite acyclovir is present.

## 2025-02-05 NOTE — ED PROVIDER NOTE - MOUTH/THROAT [+], MLM
1024: received call from mckenzie stating he has a flat tire and unsure if he will be able to make his appointment today. Stated [employer] is coming to fix tire but has not given him a time. States he does have a ride from his SO to clinic if [employer] comes to fix his tire, requested patient call at 1pm (1 hour prior to appt) with status update on if he will be able to make appt, he verbalized understanding.     No call received from patient, did not present for appointment today, rescheduled to 02/12.    +dental pain/DIFFICULTY SWALLOWING

## 2025-03-28 NOTE — ED PROVIDER NOTE - PROVIDER TOKENS
Chief Complaint   Patient presents with    Behavioral Problem     Patient presenting in clinic today to discuss neuro psych testing results.         Kun Arciniega is a 21 year old female who presents for Behavioral Problem (Patient presenting in clinic today to discuss neuro psych testing results.)    HPI  The patient came in to discuss her ADHD, anxiety, and depression.    She was diagnosed with ADHD, mainly inattentive type, after meeting with Dr. Renee Teresa, PHd. She hasn't done any personal research on treatment options. 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 She mentioned she usually only sleeps 3 to 4 hours a night. She has been recommended to consider meeting with Behavioral Health for therapy to help with her concerns of anxiety/depression by both this provider and neuropsychologist to help with sleep, mood and anxiety. Patient has been provided with lexapro in the past. She does not recall if this was helpful for her mood and anxiety or not.    She isn't using any topical treatments for her skin at the moment but would like to have this refilled to use.  She is taking pantoprazole and famotidine from GI provider as directed and is following with Dr Ha.     Recent PHQ 2/9 Score    PHQ 2:  PHQ 2 Score Adult PHQ 2 Score Adult PHQ 2 Interpretation Little interest or pleasure in activity?   3/28/2025   2:44 PM 3 Further screening needed 2       PHQ 9:  PHQ 9 Score Adult PHQ 9 Score Adult PHQ 9 Interpretation   3/28/2025   2:44 PM 15 Moderately Severe Depression         MEDICATIONS  - Current: pantoprazole, famotidine  - Discontinued: sucralfate    Review of Systems  As documented above.    Objective   Vitals:    03/28/25 1401   BP: 132/88   Pulse: 88   Resp: 14   Weight: 74.8 kg (165 lb)    Height: 5' (1.524 m)   BMI (Calculated): 32.22     Physical Exam  Gen: The patient appears well developed, well nourished, stated age, and in no distress  HEART: Regular rate and rhythm; S1,S2, no murmur  PSYCHIATRIC: Alert, good eye contact, mood and affect appropriate with thoughts connected. Does not appear to be responding to internal stimuli. Dress and appearance appropriate. Fluent speech.     - Laboratory Studies:    - Drug screen results show no illegal substances    Assessment & Plan   1. Attention Deficit Hyperactivity Disorder (ADHD)  - Sleep deprivation (3 to 4 hours per night) likely contributing to attention and anxiety, depression and sleep disturbance issues  - Referral to behavioral health team for consultation with counselor and nurse practitioner for medication management  - Discussed possibility of initiating medication therapy contingent upon drug screen results which was received today  -patient anxious to start medicaiton  - Advised to maintain a copy of ADHD diagnosis for future reference  - Discussed potential benefits and risks of stimulant medications, including exacerbating anxiety  - Informed about availability of psychologist/therapy for non-pharmacological ADHD management  - Prescription for Adderall extended release 20 mg, take one capsule daily in the morning  - Advised to report side effects: headache, loss of appetite, insomnia, tremors, shakes, twitches, mood changes, irritability, seizures  - Advised to follow up in 3 weeks for visit.   -discussed may be able to return unused medication if switching to another medication if having difficulties with medication and could switch to other such as Vyvanse or if medication is too expensive etc.  Discussed with patient the need for consistent follow up, including discussion of benefit to the medication.    Risks, benefits and side effects of the medication as well as the black box warning with stimulants  were reviewed.      Again  reviewed with patient, some patients do have other anxiety issues that are amplified by the ADD/ADHD (attention deficit hyperactivity disorder) medications as well.      We reviewed the restricted nature of refills due to the federal government since stimulants are controlled substances and the office refill policy.    Patient is up to date with medication contract-  Patient's last urine drug screen-3/14/25  -will star on adderall XR 20 mg daily      2. Anxiety-fair control  - Significant anxiety noted  - Discussed potential impact of stimulant medications on anxiety  - Advised to consider working with a psychologist/therapy for non-pharmacological management strategies  - May consider resuming escitalopram if anxiety worsens after starting Adderall    3. Recurrent Moderate Depression-fair control  - Significant depression noted  - Discussed potential impact of stimulant medications on mood  - Advised to consider working with a psychologist/therapy for non-pharmacological management strategies  - May consider resuming escitalopram if mood worsens after starting Adderall    4. Gastroesophageal Reflux Disease (GERD)  - Currently taking pantoprazole and famotidine for GERD  - Advised to continue these medications as prescribed    5. Acne  - Prescription for clindamycin gel for acne management  - Informed that gel might cause dryness    Follow-up  - Patient will follow up in 3 weeks for physical examination and to discuss effectiveness of current medication regimen    All questions and concerns were addressed.   patient verbalized understanding and agrees with the plan above. All questions were answered.       1. Attention deficit hyperactivity disorder (ADHD), predominantly inattentive type  -     SERVICE TO BEHAVIORAL HEALTH  2. SIMA (generalized anxiety disorder)  -     SERVICE TO BEHAVIORAL HEALTH  3. Major depressive disorder, recurrent episode, moderate (CMD)  -     SERVICE TO BEHAVIORAL HEALTH  4. Sleep  disturbance  -     SERVICE TO BEHAVIORAL HEALTH  5. Acne vulgaris  6. Gastroesophageal reflux disease without esophagitis  Other orders  -     clindamycin 1 % gel; Apply topically to the affected area twice daily.  -     amphetamine-dextroamphetamine (ADDERALL XR) 20 MG 24 hr capsule; Take 1 capsule by mouth daily.     NIKI:'[id number]'